# Patient Record
Sex: FEMALE | Race: WHITE | NOT HISPANIC OR LATINO | Employment: UNEMPLOYED | URBAN - METROPOLITAN AREA
[De-identification: names, ages, dates, MRNs, and addresses within clinical notes are randomized per-mention and may not be internally consistent; named-entity substitution may affect disease eponyms.]

---

## 2023-05-12 LAB
ABO GROUP (TYPE) IN BLOOD: NORMAL
ALANINE AMINOTRANSFERASE (SGPT) (U/L) IN SER/PLAS: 14 U/L (ref 7–45)
ALBUMIN (G/DL) IN SER/PLAS: 4 G/DL (ref 3.4–5)
ALKALINE PHOSPHATASE (U/L) IN SER/PLAS: 54 U/L (ref 33–110)
ANION GAP IN SER/PLAS: 13 MMOL/L (ref 10–20)
ANTIBODY SCREEN: NORMAL
ASPARTATE AMINOTRANSFERASE (SGOT) (U/L) IN SER/PLAS: 21 U/L (ref 9–39)
BILIRUBIN TOTAL (MG/DL) IN SER/PLAS: 0.4 MG/DL (ref 0–1.2)
CALCIUM (MG/DL) IN SER/PLAS: 9.2 MG/DL (ref 8.6–10.3)
CARBON DIOXIDE, TOTAL (MMOL/L) IN SER/PLAS: 23 MMOL/L (ref 21–32)
CHLORIDE (MMOL/L) IN SER/PLAS: 105 MMOL/L (ref 98–107)
CREATININE (MG/DL) IN SER/PLAS: 0.71 MG/DL (ref 0.5–1.05)
CREATININE (MG/DL) IN URINE: 230 MG/DL (ref 20–320)
ERYTHROCYTE DISTRIBUTION WIDTH (RATIO) BY AUTOMATED COUNT: 14.6 % (ref 11.5–14.5)
ERYTHROCYTE MEAN CORPUSCULAR HEMOGLOBIN CONCENTRATION (G/DL) BY AUTOMATED: 33.1 G/DL (ref 32–36)
ERYTHROCYTE MEAN CORPUSCULAR VOLUME (FL) BY AUTOMATED COUNT: 88 FL (ref 80–100)
ERYTHROCYTES (10*6/UL) IN BLOOD BY AUTOMATED COUNT: 4.17 X10E12/L (ref 4–5.2)
ESTIMATED AVERAGE GLUCOSE FOR HBA1C: 105 MG/DL
GFR FEMALE: >90 ML/MIN/1.73M2
GLUCOSE (MG/DL) IN SER/PLAS: 50 MG/DL (ref 74–99)
HEMATOCRIT (%) IN BLOOD BY AUTOMATED COUNT: 36.9 % (ref 36–46)
HEMOGLOBIN (G/DL) IN BLOOD: 12.2 G/DL (ref 12–16)
HEMOGLOBIN A1C/HEMOGLOBIN TOTAL IN BLOOD: 5.3 %
HEPATITIS B VIRUS SURFACE AG PRESENCE IN SERUM: NONREACTIVE
HEPATITIS C VIRUS AB PRESENCE IN SERUM: NONREACTIVE
HIV 1/ 2 AG/AB SCREEN: NONREACTIVE
LEUKOCYTES (10*3/UL) IN BLOOD BY AUTOMATED COUNT: 12.1 X10E9/L (ref 4.4–11.3)
PLATELETS (10*3/UL) IN BLOOD AUTOMATED COUNT: 298 X10E9/L (ref 150–450)
POTASSIUM (MMOL/L) IN SER/PLAS: 4.1 MMOL/L (ref 3.5–5.3)
PROTEIN (MG/DL) IN URINE: 16 MG/DL (ref 5–24)
PROTEIN TOTAL: 6.5 G/DL (ref 6.4–8.2)
PROTEIN/CREATININE (MG/MG) IN URINE: 0.07 MG/MG CREAT (ref 0–0.17)
REFLEX ADDED, ANEMIA PANEL: ABNORMAL
RH FACTOR: NORMAL
SODIUM (MMOL/L) IN SER/PLAS: 137 MMOL/L (ref 136–145)
SYPHILIS TOTAL AB: NONREACTIVE
THYROTROPIN (MIU/L) IN SER/PLAS BY DETECTION LIMIT <= 0.05 MIU/L: 1.72 MIU/L (ref 0.44–3.98)
UREA NITROGEN (MG/DL) IN SER/PLAS: 10 MG/DL (ref 6–23)

## 2023-05-13 LAB
CHLAMYDIA TRACH., AMPLIFIED: NEGATIVE
N. GONORRHEA, AMPLIFIED: NEGATIVE
RUBELLA VIRUS IGG AB: POSITIVE

## 2023-05-14 LAB — URINE CULTURE: NORMAL

## 2023-05-15 LAB
HEMOGLOBIN A2: 2.9 %
HEMOGLOBIN A: 96.7 %
HEMOGLOBIN F: 0.4 %
HEMOGLOBIN IDENTIFICATION INTERPRETATION: NORMAL
PATH REVIEW-HGB IDENTIFICATION: NORMAL

## 2023-08-04 LAB
ERYTHROCYTE DISTRIBUTION WIDTH (RATIO) BY AUTOMATED COUNT: 14.6 % (ref 11.5–14.5)
ERYTHROCYTE MEAN CORPUSCULAR HEMOGLOBIN CONCENTRATION (G/DL) BY AUTOMATED: 34 G/DL (ref 32–36)
ERYTHROCYTE MEAN CORPUSCULAR VOLUME (FL) BY AUTOMATED COUNT: 91 FL (ref 80–100)
ERYTHROCYTES (10*6/UL) IN BLOOD BY AUTOMATED COUNT: 4.03 X10E12/L (ref 4–5.2)
ESTIMATED AVERAGE GLUCOSE FOR HBA1C: 94 MG/DL
HEMATOCRIT (%) IN BLOOD BY AUTOMATED COUNT: 36.8 % (ref 36–46)
HEMOGLOBIN (G/DL) IN BLOOD: 12.5 G/DL (ref 12–16)
HEMOGLOBIN A1C/HEMOGLOBIN TOTAL IN BLOOD: 4.9 %
LEUKOCYTES (10*3/UL) IN BLOOD BY AUTOMATED COUNT: 10.5 X10E9/L (ref 4.4–11.3)
PLATELETS (10*3/UL) IN BLOOD AUTOMATED COUNT: 265 X10E9/L (ref 150–450)
REFLEX ADDED, ANEMIA PANEL: ABNORMAL
SYPHILIS TOTAL AB: NONREACTIVE

## 2023-09-25 PROBLEM — E10.65 TYPE 1 DIABETES MELLITUS WITH HYPERGLYCEMIA (MULTI): Status: ACTIVE | Noted: 2023-09-25

## 2023-09-25 PROBLEM — L72.0 EPIDERMAL CYST: Status: ACTIVE | Noted: 2018-04-16

## 2023-09-25 PROBLEM — L23.7 ALLERGIC CONTACT DERMATITIS DUE TO PLANTS, EXCEPT FOOD: Status: ACTIVE | Noted: 2018-04-16

## 2023-09-25 PROBLEM — E10.9 TYPE 1 DIABETES MELLITUS (MULTI): Status: ACTIVE | Noted: 2023-09-25

## 2023-09-25 PROBLEM — F41.9 ANXIETY: Status: ACTIVE | Noted: 2023-09-25

## 2023-09-25 PROBLEM — Z96.41 INSULIN PUMP IN PLACE: Status: ACTIVE | Noted: 2023-09-25

## 2023-09-25 PROBLEM — E11.36: Status: ACTIVE | Noted: 2023-09-25

## 2023-09-25 PROBLEM — L70.0 CYSTIC ACNE VULGARIS: Status: ACTIVE | Noted: 2018-04-16

## 2023-09-25 PROBLEM — O24.011: Status: ACTIVE | Noted: 2023-09-25

## 2023-09-25 PROBLEM — E11.9 DIABETES MELLITUS (MULTI): Status: ACTIVE | Noted: 2023-09-25

## 2023-09-25 PROBLEM — H04.123 DRY EYE SYNDROME OF BOTH LACRIMAL GLANDS: Status: ACTIVE | Noted: 2023-09-25

## 2023-09-25 RX ORDER — MINOCYCLINE HYDROCHLORIDE 100 MG/1
CAPSULE ORAL
COMMUNITY
Start: 2016-12-13 | End: 2023-09-27

## 2023-09-25 RX ORDER — TRIAMCINOLONE ACETONIDE 1 MG/G
CREAM TOPICAL
COMMUNITY
Start: 2017-07-10 | End: 2024-01-18

## 2023-09-25 RX ORDER — BLOOD SUGAR DIAGNOSTIC
STRIP MISCELLANEOUS
COMMUNITY
Start: 2015-12-03 | End: 2024-02-01 | Stop reason: ALTCHOICE

## 2023-09-25 RX ORDER — CLINDAMYCIN PHOSPHATE 10 UG/ML
LOTION TOPICAL
Status: ON HOLD | COMMUNITY
Start: 2016-02-03 | End: 2023-10-07 | Stop reason: ALTCHOICE

## 2023-09-25 RX ORDER — URINE ACETONE TEST STRIPS
STRIP MISCELLANEOUS
COMMUNITY
Start: 2015-04-23 | End: 2024-02-01 | Stop reason: ALTCHOICE

## 2023-09-25 RX ORDER — AMMONIUM LACTATE 12 G/100G
CREAM TOPICAL
COMMUNITY
Start: 2016-12-29 | End: 2024-01-18

## 2023-09-27 ASSESSMENT — EDINBURGH POSTNATAL DEPRESSION SCALE (EPDS)
I HAVE BEEN SO UNHAPPY THAT I HAVE HAD DIFFICULTY SLEEPING: NOT AT ALL
TOTAL SCORE: 9
I HAVE BEEN SO UNHAPPY THAT I HAVE BEEN CRYING: ONLY OCCASIONALLY
I HAVE FELT SAD OR MISERABLE: NOT VERY OFTEN
I HAVE BLAMED MYSELF UNNECESSARILY WHEN THINGS WENT WRONG: NOT VERY OFTEN
I HAVE BEEN ANXIOUS OR WORRIED FOR NO GOOD REASON: YES, SOMETIMES
I HAVE FELT SCARED OR PANICKY FOR NO GOOD REASON: NO, NOT AT ALL
I HAVE LOOKED FORWARD WITH ENJOYMENT TO THINGS: RATHER LESS THAN I USED TO
THE THOUGHT OF HARMING MYSELF HAS OCCURRED TO ME: NEVER
THINGS HAVE BEEN GETTING ON TOP OF ME: YES, SOMETIMES I HAVEN'T BEEN COPING AS WELL AS USUAL
I HAVE BEEN ABLE TO LAUGH AND SEE THE FUNNY SIDE OF THINGS: NOT QUITE SO MUCH NOW

## 2023-10-01 LAB — GROUP B STREP SCREEN: ABNORMAL

## 2023-10-02 ENCOUNTER — PROCEDURE VISIT (OUTPATIENT)
Dept: OBSTETRICS AND GYNECOLOGY | Facility: CLINIC | Age: 28
End: 2023-10-02
Payer: MEDICAID

## 2023-10-02 ENCOUNTER — HOSPITAL ENCOUNTER (OUTPATIENT)
Facility: HOSPITAL | Age: 28
End: 2023-10-02
Attending: STUDENT IN AN ORGANIZED HEALTH CARE EDUCATION/TRAINING PROGRAM | Admitting: STUDENT IN AN ORGANIZED HEALTH CARE EDUCATION/TRAINING PROGRAM
Payer: MEDICAID

## 2023-10-02 VITALS — SYSTOLIC BLOOD PRESSURE: 126 MMHG | DIASTOLIC BLOOD PRESSURE: 79 MMHG

## 2023-10-02 DIAGNOSIS — Z3A.36 36 WEEKS GESTATION OF PREGNANCY (HHS-HCC): Primary | ICD-10-CM

## 2023-10-02 DIAGNOSIS — O24.013 TYPE 1 DIABETES MELLITUS AFFECTING PREGNANCY IN THIRD TRIMESTER, ANTEPARTUM (HHS-HCC): ICD-10-CM

## 2023-10-02 PROBLEM — O24.011: Status: RESOLVED | Noted: 2023-09-25 | Resolved: 2023-10-02

## 2023-10-02 PROBLEM — E11.9 DIABETES MELLITUS (MULTI): Status: RESOLVED | Noted: 2023-09-25 | Resolved: 2023-10-02

## 2023-10-02 PROCEDURE — 59025 FETAL NON-STRESS TEST: CPT | Performed by: OBSTETRICS & GYNECOLOGY

## 2023-10-02 NOTE — PROGRESS NOTES
Becca Irene, a  at 36w2d with an TIM of 10/28/2023, by Ultrasound, was seen at  [facility] for a nonstress test for T1DM    Non-Stress Test   Baseline Fetal Heart Rate for Non-Stress Test: 150 BPM  Variability in Waveform for Non-Stress Test: Moderate  Accelerations in Non-Stress Test: Yes  Decelerations in Non-Stress Test: None  Contractions in Non-Stress Test: Irregular  Acoustic Stimulator for Non-Stress Test: No  Interpretation of Non-Stress Test   Interpretation of Non-Stress Test: Reactive    Ramirez Rincon MD  Maternal Fetal Medicine

## 2023-10-05 PROBLEM — Z3A.36 36 WEEKS GESTATION OF PREGNANCY (HHS-HCC): Status: ACTIVE | Noted: 2023-10-05

## 2023-10-05 PROBLEM — F32.A DEPRESSION AFFECTING PREGNANCY IN THIRD TRIMESTER, ANTEPARTUM (HHS-HCC): Status: ACTIVE | Noted: 2023-10-05

## 2023-10-05 PROBLEM — O99.343 DEPRESSION AFFECTING PREGNANCY IN THIRD TRIMESTER, ANTEPARTUM (HHS-HCC): Status: ACTIVE | Noted: 2023-10-05

## 2023-10-05 NOTE — PROGRESS NOTES
26yo G1 at 36w6d presenting for follow up for T1DM.    Doing well, no acute complaints.  Has noted significant BLE over the last several days No VB, LOF, ctx. endorses good FM.  No headache, vision cahgnes, RUQ pain.      CGM reviewed    TAR: 27%  TIR: 71%  Low: 2%  vLow: <1%  Avg 122    O: See ACOGs  Gen-NAD  Cardiac- good peripheral perfusion   Respiratory- non-labored breathing   Abdomen- soft, non-tender, gravid  Extremities- symmetrical     Sono- BPP 8/8, CADE 17.9  9/22:EFW 93%, AC >99%, normal CADE, BPP 8/8    9/1:EFW 76%, AC 89%, normal CADE, BPP 8/8, breech    T1DM   - Diagnosed at age 9. Last year hospitalization for DKA in s/o flu, denies recent DKA episodes or hospitalizations   - Baseline HELLP labs and p/c normal  - Last HA1c 6.8 (3/2023), repeat 8/4 4.9%  - Fetal echo wnl  - Ophthalmology and podiatry referral provided, pending appt.  Reminded to schedule  - EKG with right axis deviation and short NH interval.   - hypoglycemic aware  - has ketone strips and glucagon at home  - Ha dong discussion on peripartum management, tentatively wants to krystle pump.  See PL for peripartum plan.   Pump profiles set up for intrapartum and immediate postpartum settings.  - Current pump settings adjusted as below:  Basal                     Carb Ratio         CF     2263-7060 1.65          1:5.0            1:16  [id number] 1.70          1:5.0            1:16   [id number] 1.35          1:3.0            1:16  5667-9631 1.20          1:2.5            1:16  [id number] 1.20          1:3.0            1:16    Max bolus 25    LGA Fetus   - Last EFW 93%ile with EFW 3078g on 9/22/23  - Extrapolation EFW at time of delivery 3700-3900gm    Depression  -Prior to pregnancy was on fluoxetine- stopped prior to pregnancy  -Symptoms stable      gHTN  - BP mild range x2 in the office.  Asymptomatic  - Will check PEC labs today  - Will move up IOL to 37w0d (tomorrow)    IUP   - Last Pap  NIL 2022  - declines Flu, COVID vaccines  - NT subopt but  grossly normal, declines aneuploidy screening.  - ppBC: desires OCPs  - Anatomy unremarkable  - s/p Tdap   - Does not plan to breastfeed   - Patient is Hero and does not have a smart phone with ability to send in CGM and pump data. Will schedule visits weekly now for review   -GBS positive    -IOL scheduled tomorrow.    Ramirez Rincon MD

## 2023-10-06 ENCOUNTER — ROUTINE PRENATAL (OUTPATIENT)
Dept: MATERNAL FETAL MEDICINE | Facility: CLINIC | Age: 28
End: 2023-10-06
Payer: MEDICAID

## 2023-10-06 ENCOUNTER — LAB (OUTPATIENT)
Dept: LAB | Facility: LAB | Age: 28
End: 2023-10-06
Payer: MEDICAID

## 2023-10-06 ENCOUNTER — APPOINTMENT (OUTPATIENT)
Dept: RADIOLOGY | Facility: CLINIC | Age: 28
End: 2023-10-06
Payer: MEDICAID

## 2023-10-06 ENCOUNTER — ANCILLARY PROCEDURE (OUTPATIENT)
Dept: RADIOLOGY | Facility: CLINIC | Age: 28
End: 2023-10-06
Payer: MEDICAID

## 2023-10-06 VITALS — DIASTOLIC BLOOD PRESSURE: 94 MMHG | BODY MASS INDEX: 35.44 KG/M2 | SYSTOLIC BLOOD PRESSURE: 137 MMHG | WEIGHT: 219.6 LBS

## 2023-10-06 DIAGNOSIS — Z3A.37 37 WEEKS GESTATION OF PREGNANCY (HHS-HCC): ICD-10-CM

## 2023-10-06 DIAGNOSIS — O13.3 GESTATIONAL HYPERTENSION, THIRD TRIMESTER (HHS-HCC): ICD-10-CM

## 2023-10-06 DIAGNOSIS — O24.013 TYPE 1 DIABETES MELLITUS AFFECTING PREGNANCY IN THIRD TRIMESTER, ANTEPARTUM (HHS-HCC): Primary | ICD-10-CM

## 2023-10-06 DIAGNOSIS — Z32.01 PREGNANCY TEST POSITIVE (HHS-HCC): ICD-10-CM

## 2023-10-06 DIAGNOSIS — Z3A.36 36 WEEKS GESTATION OF PREGNANCY (HHS-HCC): ICD-10-CM

## 2023-10-06 DIAGNOSIS — F32.A DEPRESSION AFFECTING PREGNANCY IN THIRD TRIMESTER, ANTEPARTUM (HHS-HCC): ICD-10-CM

## 2023-10-06 DIAGNOSIS — O99.343 DEPRESSION AFFECTING PREGNANCY IN THIRD TRIMESTER, ANTEPARTUM (HHS-HCC): ICD-10-CM

## 2023-10-06 LAB
ALBUMIN SERPL BCP-MCNC: 3.5 G/DL (ref 3.4–5)
ALP SERPL-CCNC: 191 U/L (ref 33–110)
ALT SERPL W P-5'-P-CCNC: 12 U/L (ref 7–45)
ANION GAP SERPL CALC-SCNC: 14 MMOL/L (ref 10–20)
AST SERPL W P-5'-P-CCNC: 21 U/L (ref 9–39)
BILIRUB SERPL-MCNC: 0.6 MG/DL (ref 0–1.2)
BUN SERPL-MCNC: 5 MG/DL (ref 6–23)
CALCIUM SERPL-MCNC: 9 MG/DL (ref 8.6–10.3)
CHLORIDE SERPL-SCNC: 107 MMOL/L (ref 98–107)
CO2 SERPL-SCNC: 20 MMOL/L (ref 21–32)
CREAT SERPL-MCNC: 0.61 MG/DL (ref 0.5–1.05)
ERYTHROCYTE [DISTWIDTH] IN BLOOD BY AUTOMATED COUNT: 13.9 % (ref 11.5–14.5)
GFR SERPL CREATININE-BSD FRML MDRD: >90 ML/MIN/1.73M*2
GLUCOSE SERPL-MCNC: 95 MG/DL (ref 74–99)
HCT VFR BLD AUTO: 35.2 % (ref 36–46)
HGB BLD-MCNC: 12 G/DL (ref 12–16)
LDH SERPL L TO P-CCNC: 172 U/L (ref 84–246)
MCH RBC QN AUTO: 31.2 PG (ref 26–34)
MCHC RBC AUTO-ENTMCNC: 34.1 G/DL (ref 32–36)
MCV RBC AUTO: 91 FL (ref 80–100)
NRBC BLD-RTO: 0 /100 WBCS (ref 0–0)
PLATELET # BLD AUTO: 196 X10*3/UL (ref 150–450)
PMV BLD AUTO: 11 FL (ref 7.5–11.5)
POTASSIUM SERPL-SCNC: 4.1 MMOL/L (ref 3.5–5.3)
PROT SERPL-MCNC: 6.1 G/DL (ref 6.4–8.2)
RBC # BLD AUTO: 3.85 X10*6/UL (ref 4–5.2)
SODIUM SERPL-SCNC: 137 MMOL/L (ref 136–145)
WBC # BLD AUTO: 11.7 X10*3/UL (ref 4.4–11.3)

## 2023-10-06 PROCEDURE — 36415 COLL VENOUS BLD VENIPUNCTURE: CPT

## 2023-10-06 PROCEDURE — 80053 COMPREHEN METABOLIC PANEL: CPT

## 2023-10-06 PROCEDURE — 99215 OFFICE O/P EST HI 40 MIN: CPT | Performed by: OBSTETRICS & GYNECOLOGY

## 2023-10-06 PROCEDURE — 85027 COMPLETE CBC AUTOMATED: CPT

## 2023-10-06 PROCEDURE — 83615 LACTATE (LD) (LDH) ENZYME: CPT

## 2023-10-06 PROCEDURE — 82570 ASSAY OF URINE CREATININE: CPT | Performed by: OBSTETRICS & GYNECOLOGY

## 2023-10-06 PROCEDURE — 76819 FETAL BIOPHYS PROFIL W/O NST: CPT

## 2023-10-06 PROCEDURE — 76819 FETAL BIOPHYS PROFIL W/O NST: CPT | Performed by: OBSTETRICS & GYNECOLOGY

## 2023-10-06 RX ORDER — BLOOD-GLUCOSE TRANSMITTER
EACH MISCELLANEOUS
Qty: 1 EACH | Refills: 3 | Status: SHIPPED | OUTPATIENT
Start: 2023-10-06 | End: 2024-01-18 | Stop reason: SDUPTHER

## 2023-10-07 ENCOUNTER — ANESTHESIA (OUTPATIENT)
Dept: OBSTETRICS AND GYNECOLOGY | Facility: HOSPITAL | Age: 28
End: 2023-10-07
Payer: MEDICAID

## 2023-10-07 ENCOUNTER — HOSPITAL ENCOUNTER (INPATIENT)
Facility: HOSPITAL | Age: 28
LOS: 4 days | Discharge: HOME | End: 2023-10-11
Attending: OBSTETRICS & GYNECOLOGY | Admitting: OBSTETRICS & GYNECOLOGY
Payer: MEDICAID

## 2023-10-07 ENCOUNTER — APPOINTMENT (OUTPATIENT)
Dept: OBSTETRICS AND GYNECOLOGY | Facility: HOSPITAL | Age: 28
End: 2023-10-07
Payer: MEDICAID

## 2023-10-07 ENCOUNTER — ANESTHESIA EVENT (OUTPATIENT)
Dept: OBSTETRICS AND GYNECOLOGY | Facility: HOSPITAL | Age: 28
End: 2023-10-07
Payer: MEDICAID

## 2023-10-07 DIAGNOSIS — G89.18 POSTOPERATIVE PAIN: ICD-10-CM

## 2023-10-07 DIAGNOSIS — Z3A.37 37 WEEKS GESTATION OF PREGNANCY (HHS-HCC): Primary | ICD-10-CM

## 2023-10-07 DIAGNOSIS — Z98.891 STATUS POST CESAREAN DELIVERY: ICD-10-CM

## 2023-10-07 PROBLEM — K21.9 GASTROESOPHAGEAL REFLUX DISEASE: Status: ACTIVE | Noted: 2023-10-07

## 2023-10-07 LAB
ABO GROUP (TYPE) IN BLOOD: NORMAL
ANTIBODY SCREEN: NORMAL
CREAT UR-MCNC: 65.6 MG/DL (ref 20–320)
ERYTHROCYTE [DISTWIDTH] IN BLOOD BY AUTOMATED COUNT: 13.7 % (ref 11.5–14.5)
GLUCOSE BLD MANUAL STRIP-MCNC: 108 MG/DL (ref 74–99)
GLUCOSE BLD MANUAL STRIP-MCNC: 118 MG/DL (ref 74–99)
HCT VFR BLD AUTO: 33.8 % (ref 36–46)
HGB BLD-MCNC: 11.4 G/DL (ref 12–16)
MCH RBC QN AUTO: 31.3 PG (ref 26–34)
MCHC RBC AUTO-ENTMCNC: 33.7 G/DL (ref 32–36)
MCV RBC AUTO: 93 FL (ref 80–100)
NRBC BLD-RTO: 0 /100 WBCS (ref 0–0)
PLATELET # BLD AUTO: 181 X10*3/UL (ref 150–450)
PMV BLD AUTO: 11.8 FL (ref 7.5–11.5)
POC FINGERSTICK BLOOD GLUCOSE: 108 MG/DL (ref 70–100)
PROT UR-ACNC: 12 MG/DL (ref 5–24)
PROT/CREAT UR: 0.18 MG/MG CREAT (ref 0–0.17)
RBC # BLD AUTO: 3.64 X10*6/UL (ref 4–5.2)
RH FACTOR (ANTIGEN D): NORMAL
T PALLIDUM AB SER QL: NONREACTIVE
WBC # BLD AUTO: 10.4 X10*3/UL (ref 4.4–11.3)

## 2023-10-07 PROCEDURE — 82947 ASSAY GLUCOSE BLOOD QUANT: CPT

## 2023-10-07 PROCEDURE — 86920 COMPATIBILITY TEST SPIN: CPT

## 2023-10-07 PROCEDURE — 2500000001 HC RX 250 WO HCPCS SELF ADMINISTERED DRUGS (ALT 637 FOR MEDICARE OP)

## 2023-10-07 PROCEDURE — 86900 BLOOD TYPING SEROLOGIC ABO: CPT | Performed by: STUDENT IN AN ORGANIZED HEALTH CARE EDUCATION/TRAINING PROGRAM

## 2023-10-07 PROCEDURE — 2500000004 HC RX 250 GENERAL PHARMACY W/ HCPCS (ALT 636 FOR OP/ED)

## 2023-10-07 PROCEDURE — 2500000004 HC RX 250 GENERAL PHARMACY W/ HCPCS (ALT 636 FOR OP/ED): Performed by: STUDENT IN AN ORGANIZED HEALTH CARE EDUCATION/TRAINING PROGRAM

## 2023-10-07 PROCEDURE — 36415 COLL VENOUS BLD VENIPUNCTURE: CPT | Performed by: STUDENT IN AN ORGANIZED HEALTH CARE EDUCATION/TRAINING PROGRAM

## 2023-10-07 PROCEDURE — 86780 TREPONEMA PALLIDUM: CPT | Performed by: STUDENT IN AN ORGANIZED HEALTH CARE EDUCATION/TRAINING PROGRAM

## 2023-10-07 PROCEDURE — 1120000001 HC OB PRIVATE ROOM DAILY

## 2023-10-07 PROCEDURE — 2580000001 HC RX 258 IV SOLUTIONS: Performed by: STUDENT IN AN ORGANIZED HEALTH CARE EDUCATION/TRAINING PROGRAM

## 2023-10-07 PROCEDURE — 85027 COMPLETE CBC AUTOMATED: CPT | Performed by: STUDENT IN AN ORGANIZED HEALTH CARE EDUCATION/TRAINING PROGRAM

## 2023-10-07 PROCEDURE — 3E0P7VZ INTRODUCTION OF HORMONE INTO FEMALE REPRODUCTIVE, VIA NATURAL OR ARTIFICIAL OPENING: ICD-10-PCS | Performed by: OBSTETRICS & GYNECOLOGY

## 2023-10-07 RX ORDER — OXYTOCIN/0.9 % SODIUM CHLORIDE 30/500 ML
2-30 PLASTIC BAG, INJECTION (ML) INTRAVENOUS CONTINUOUS
Status: DISCONTINUED | OUTPATIENT
Start: 2023-10-07 | End: 2023-10-09

## 2023-10-07 RX ORDER — CARBOPROST TROMETHAMINE 250 UG/ML
250 INJECTION, SOLUTION INTRAMUSCULAR ONCE AS NEEDED
Status: COMPLETED | OUTPATIENT
Start: 2023-10-07 | End: 2023-10-09

## 2023-10-07 RX ORDER — BUTORPHANOL TARTRATE 1 MG/ML
1 INJECTION INTRAMUSCULAR; INTRAVENOUS EVERY 10 MIN PRN
Status: DISCONTINUED | OUTPATIENT
Start: 2023-10-07 | End: 2023-10-10

## 2023-10-07 RX ORDER — MISOPROSTOL 200 UG/1
800 TABLET ORAL ONCE AS NEEDED
Status: COMPLETED | OUTPATIENT
Start: 2023-10-07 | End: 2023-10-09

## 2023-10-07 RX ORDER — LOPERAMIDE HYDROCHLORIDE 2 MG/1
4 CAPSULE ORAL EVERY 2 HOUR PRN
Status: DISCONTINUED | OUTPATIENT
Start: 2023-10-07 | End: 2023-10-09

## 2023-10-07 RX ORDER — SODIUM CHLORIDE, SODIUM LACTATE, POTASSIUM CHLORIDE, CALCIUM CHLORIDE 600; 310; 30; 20 MG/100ML; MG/100ML; MG/100ML; MG/100ML
125 INJECTION, SOLUTION INTRAVENOUS CONTINUOUS
Status: DISCONTINUED | OUTPATIENT
Start: 2023-10-07 | End: 2023-10-08

## 2023-10-07 RX ORDER — DEXTROSE 40 %
15 GEL (GRAM) ORAL
Status: DISCONTINUED | OUTPATIENT
Start: 2023-10-07 | End: 2023-10-11 | Stop reason: HOSPADM

## 2023-10-07 RX ORDER — PENICILLIN G 3000000 [IU]/50ML
3 INJECTION, SOLUTION INTRAVENOUS EVERY 4 HOURS
Status: DISCONTINUED | OUTPATIENT
Start: 2023-10-07 | End: 2023-10-09

## 2023-10-07 RX ORDER — LABETALOL HYDROCHLORIDE 5 MG/ML
20 INJECTION, SOLUTION INTRAVENOUS ONCE AS NEEDED
Status: DISCONTINUED | OUTPATIENT
Start: 2023-10-07 | End: 2023-10-09

## 2023-10-07 RX ORDER — ONDANSETRON HYDROCHLORIDE 2 MG/ML
4 INJECTION, SOLUTION INTRAVENOUS EVERY 6 HOURS PRN
Status: DISCONTINUED | OUTPATIENT
Start: 2023-10-07 | End: 2023-10-09

## 2023-10-07 RX ORDER — OXYTOCIN 10 [USP'U]/ML
10 INJECTION, SOLUTION INTRAMUSCULAR; INTRAVENOUS ONCE AS NEEDED
Status: DISCONTINUED | OUTPATIENT
Start: 2023-10-07 | End: 2023-10-09

## 2023-10-07 RX ORDER — FENTANYL CITRATE 50 UG/ML
25 INJECTION, SOLUTION INTRAMUSCULAR; INTRAVENOUS ONCE
Status: COMPLETED | OUTPATIENT
Start: 2023-10-07 | End: 2023-10-07

## 2023-10-07 RX ORDER — HYDRALAZINE HYDROCHLORIDE 20 MG/ML
5 INJECTION INTRAMUSCULAR; INTRAVENOUS ONCE AS NEEDED
Status: DISCONTINUED | OUTPATIENT
Start: 2023-10-07 | End: 2023-10-09

## 2023-10-07 RX ORDER — DEXTROSE 50 % IN WATER (D50W) INTRAVENOUS SYRINGE
25
Status: DISCONTINUED | OUTPATIENT
Start: 2023-10-07 | End: 2023-10-10

## 2023-10-07 RX ORDER — FENTANYL/BUPIVACAINE/NS/PF 2MCG/ML-.1
0-54 PLASTIC BAG, INJECTION (ML) INJECTION CONTINUOUS
Status: DISCONTINUED | OUTPATIENT
Start: 2023-10-07 | End: 2023-10-09

## 2023-10-07 RX ORDER — CYCLOBENZAPRINE HCL 10 MG
10 TABLET ORAL ONCE
Status: COMPLETED | OUTPATIENT
Start: 2023-10-07 | End: 2023-10-07

## 2023-10-07 RX ORDER — TRANEXAMIC ACID 100 MG/ML
1000 INJECTION, SOLUTION INTRAVENOUS ONCE AS NEEDED
Status: COMPLETED | OUTPATIENT
Start: 2023-10-07 | End: 2023-10-09

## 2023-10-07 RX ORDER — ONDANSETRON 4 MG/1
4 TABLET, FILM COATED ORAL EVERY 6 HOURS PRN
Status: DISCONTINUED | OUTPATIENT
Start: 2023-10-07 | End: 2023-10-09

## 2023-10-07 RX ORDER — METOCLOPRAMIDE HYDROCHLORIDE 5 MG/ML
10 INJECTION INTRAMUSCULAR; INTRAVENOUS EVERY 6 HOURS PRN
Status: DISCONTINUED | OUTPATIENT
Start: 2023-10-07 | End: 2023-10-10

## 2023-10-07 RX ORDER — METOCLOPRAMIDE 10 MG/1
10 TABLET ORAL EVERY 6 HOURS PRN
Status: DISCONTINUED | OUTPATIENT
Start: 2023-10-07 | End: 2023-10-10

## 2023-10-07 RX ORDER — NALBUPHINE HYDROCHLORIDE 10 MG/ML
10 INJECTION, SOLUTION INTRAMUSCULAR; INTRAVENOUS; SUBCUTANEOUS
Status: DISCONTINUED | OUTPATIENT
Start: 2023-10-07 | End: 2023-10-10

## 2023-10-07 RX ORDER — METHYLERGONOVINE MALEATE 0.2 MG/ML
0.2 INJECTION INTRAVENOUS ONCE AS NEEDED
Status: DISCONTINUED | OUTPATIENT
Start: 2023-10-07 | End: 2023-10-09

## 2023-10-07 RX ORDER — OXYTOCIN/0.9 % SODIUM CHLORIDE 30/500 ML
60 PLASTIC BAG, INJECTION (ML) INTRAVENOUS
Status: DISCONTINUED | OUTPATIENT
Start: 2023-10-07 | End: 2023-10-09

## 2023-10-07 RX ORDER — NIFEDIPINE 10 MG/1
10 CAPSULE ORAL ONCE AS NEEDED
Status: DISCONTINUED | OUTPATIENT
Start: 2023-10-07 | End: 2023-10-09

## 2023-10-07 RX ORDER — LIDOCAINE HYDROCHLORIDE 10 MG/ML
30 INJECTION INFILTRATION; PERINEURAL ONCE AS NEEDED
Status: DISCONTINUED | OUTPATIENT
Start: 2023-10-07 | End: 2023-10-10

## 2023-10-07 RX ORDER — TERBUTALINE SULFATE 1 MG/ML
0.25 INJECTION SUBCUTANEOUS ONCE AS NEEDED
Status: DISCONTINUED | OUTPATIENT
Start: 2023-10-07 | End: 2023-10-10

## 2023-10-07 RX ADMIN — CYCLOBENZAPRINE 10 MG: 10 TABLET, FILM COATED ORAL at 18:41

## 2023-10-07 RX ADMIN — PENICILLIN G POTASSIUM 5 MILLION UNITS: 5000000 POWDER, FOR SOLUTION INTRAMUSCULAR; INTRAPLEURAL; INTRATHECAL; INTRAVENOUS at 11:47

## 2023-10-07 RX ADMIN — MISOPROSTOL 25 MCG: 100 TABLET ORAL at 12:25

## 2023-10-07 RX ADMIN — SODIUM CHLORIDE, POTASSIUM CHLORIDE, SODIUM LACTATE AND CALCIUM CHLORIDE 125 ML/HR: 600; 310; 30; 20 INJECTION, SOLUTION INTRAVENOUS at 11:47

## 2023-10-07 RX ADMIN — LIDOCAINE HYDROCHLORIDE,EPINEPHRINE BITARTRATE 3 ML: 20; .005 INJECTION, SOLUTION EPIDURAL; INFILTRATION; INTRACAUDAL; PERINEURAL at 23:55

## 2023-10-07 RX ADMIN — PENICILLIN G 3 MILLION UNITS: 3000000 INJECTION, SOLUTION INTRAVENOUS at 15:31

## 2023-10-07 RX ADMIN — Medication 5 ML: at 23:58

## 2023-10-07 RX ADMIN — SODIUM CHLORIDE, POTASSIUM CHLORIDE, SODIUM LACTATE AND CALCIUM CHLORIDE 125 ML/HR: 600; 310; 30; 20 INJECTION, SOLUTION INTRAVENOUS at 20:39

## 2023-10-07 RX ADMIN — SODIUM CHLORIDE, POTASSIUM CHLORIDE, SODIUM LACTATE AND CALCIUM CHLORIDE 500 ML: 600; 310; 30; 20 INJECTION, SOLUTION INTRAVENOUS at 23:18

## 2023-10-07 RX ADMIN — PENICILLIN G 3 MILLION UNITS: 3000000 INJECTION, SOLUTION INTRAVENOUS at 20:39

## 2023-10-07 RX ADMIN — FENTANYL CITRATE 25 MCG: 50 INJECTION INTRAMUSCULAR; INTRAVENOUS at 12:06

## 2023-10-07 SDOH — ECONOMIC STABILITY: HOUSING INSECURITY: DO YOU FEEL UNSAFE GOING BACK TO THE PLACE WHERE YOU ARE LIVING?: NO

## 2023-10-07 SDOH — HEALTH STABILITY: MENTAL HEALTH: WERE YOU ABLE TO COMPLETE ALL THE BEHAVIORAL HEALTH SCREENINGS?: YES

## 2023-10-07 SDOH — HEALTH STABILITY: MENTAL HEALTH: HAVE YOU USED ANY PRESCRIPTION DRUGS OTHER THAN PRESCRIBED IN THE PAST 12 MONTHS?: NO

## 2023-10-07 SDOH — ECONOMIC STABILITY: FOOD INSECURITY: WITHIN THE PAST 12 MONTHS, YOU WORRIED THAT YOUR FOOD WOULD RUN OUT BEFORE YOU GOT MONEY TO BUY MORE.: NEVER TRUE

## 2023-10-07 SDOH — SOCIAL STABILITY: SOCIAL INSECURITY: ARE YOU OR HAVE YOU BEEN THREATENED OR ABUSED PHYSICALLY, EMOTIONALLY, OR SEXUALLY BY ANYONE?: NO

## 2023-10-07 SDOH — HEALTH STABILITY: MENTAL HEALTH: NON-SPECIFIC ACTIVE SUICIDAL THOUGHTS (PAST 1 MONTH): NO

## 2023-10-07 SDOH — HEALTH STABILITY: MENTAL HEALTH: WISH TO BE DEAD (PAST 1 MONTH): NO

## 2023-10-07 SDOH — HEALTH STABILITY: MENTAL HEALTH: SUICIDAL BEHAVIOR (LIFETIME): NO

## 2023-10-07 SDOH — ECONOMIC STABILITY: FOOD INSECURITY: WITHIN THE PAST 12 MONTHS, THE FOOD YOU BOUGHT JUST DIDN'T LAST AND YOU DIDN'T HAVE MONEY TO GET MORE.: NEVER TRUE

## 2023-10-07 SDOH — HEALTH STABILITY: MENTAL HEALTH: HAVE YOU USED ANY SUBSTANCES (CANABIS, COCAINE, HEROIN, HALLUCINOGENS, INHALANTS, ETC.) IN THE PAST 12 MONTHS?: NO

## 2023-10-07 SDOH — SOCIAL STABILITY: SOCIAL INSECURITY: PHYSICAL ABUSE: DENIES

## 2023-10-07 SDOH — SOCIAL STABILITY: SOCIAL INSECURITY: ABUSE SCREEN: ADULT

## 2023-10-07 SDOH — HEALTH STABILITY: MENTAL HEALTH: CURRENT SMOKER: 0

## 2023-10-07 SDOH — SOCIAL STABILITY: SOCIAL INSECURITY: VERBAL ABUSE: DENIES

## 2023-10-07 ASSESSMENT — PAIN SCALES - GENERAL
PAINLEVEL_OUTOF10: 3
PAINLEVEL_OUTOF10: 5 - MODERATE PAIN
PAINLEVEL_OUTOF10: 0 - NO PAIN
PAINLEVEL_OUTOF10: 4
PAINLEVEL_OUTOF10: 0 - NO PAIN
PAINLEVEL_OUTOF10: 5 - MODERATE PAIN
PAINLEVEL_OUTOF10: 0 - NO PAIN
PAINLEVEL_OUTOF10: 4
PAINLEVEL_OUTOF10: 0 - NO PAIN
PAINLEVEL_OUTOF10: 3
PAINLEVEL_OUTOF10: 4
PAINLEVEL_OUTOF10: 0 - NO PAIN

## 2023-10-07 NOTE — NURSING NOTE
Cervical ripening balloon placed by Dr. Garcia at 1223; filled with 60cc sterile water; Secured to patient's leg with tape; Patient tolerated well

## 2023-10-07 NOTE — INDIVIDUALIZED OVERALL PLAN OF CARE NOTE
Patient doing well with balloon in place. Feeling intermittent contractions.         SVE: CRB in place  FHT: 120/mod/+accel/-decel  TOCO: q1-2min    A&P  - will continue with CRB, no second cytotec at this time due to contraction pattern of q1-2min  - will continue to monitor    T1DM  - q1 hour BG checks when npo otherwise 1 hour postprandial with self sliding scale using pump     gHTN  - dx by mild ranges greater than 4 hours apart  - HELLP Labs neg, P:C 0.18  - asx     Amine MD Jose

## 2023-10-07 NOTE — H&P
Obstetrical Admission History and Physical     Becca Irene is a 28 y.o.  at 37w0d. TIM: 10/28/2023, by Ultrasound. She has had prenatal care with MFM      Chief Complaint: Scheduled Induction    Assessment/Plan      IOL  - admit to L&D for induction of labor with CRB and miso  - will monitor for cervical change, will augment labor as required with pitocin and AROM following induction  - delivery plan: patient desires vaginal delivery, patient counseled on risks and benefits of induction of labor and possibility of c/s for failure of induction or non-reassuring fetal status  - type and screen and CBC on admission  - epidural as desired for pain control  - in setting of T1DM and size, discussed risk of shoulder dystocia including possible maneuvers and possible  nerve and bone injury    GBS positive   -intrapartum PCN indicated     gHTN  - dx by mild ranges greater than 4 hours apart  - HELLP Labs neg, P:C 0.18  - asx    T1DM  - Last Growth  EFW 3078g 93%, AC >99%, extrapolates to 3400g  - q1 hour BG checks when npo otherwise 1 hour postprandial with self sliding scale using pump  - Diagnosed at age 9.   - Last year hospitalization for DKA in s/o flu, denies recent DKA episodes or hospitalizations   - Baseline HELLP labs and p/c normal   - Last HA1c 6.8 (3/2023), repeat  4.9%   - Fetal echo wnl   - hypoglycemic aware   - Current pump tandem/dexcom suing control IQ     Ripening:  - usual end of pregnancy regimen     Active labor/decreased po intake (ie oxytocin initiation):  -IVF to include dextrose  -Turn control IQ off.  -Temporary basal on pump (basal rate at time of transition) with hourly adjustments PRN based on below:  BG <70        --> decrease pump basal by 20%, treat hypoglycemia (oral intake or D10)  BG    --> maintain current pump basal rate  -150 --> increase pump basal rate by 20%          --> contact MFM, may need insulin infusion     Postpartum:   - Resume  control IQ  - New settings:  Basal                     Carb Ratio         CF     [id number] 0.80          1:11            1:35  8975-4162 0.85          1:11            1:35   7071-8190 0.65          1:7              1:35  5570-0811 0.60          1:6              1:35  4628-7794 0.60          1:7              1:35     Target 130  Insulin DOA 5 hrs  Max bolus 25    Fetal status  - FHT Cat I   - CEFM    PPBC  - discussed options for postpartum birth control  - Patient desires POPs    Gila Garcia, PGY3  Seen and discussed with Dr. Yanes    Principal Problem:    37 weeks gestation of pregnancy      Pregnancy Problems (from 03/28/23 to present)       Problem Noted Resolved    37 weeks gestation of pregnancy 10/7/2023 by Gila Garcia MD No    Priority:  Medium      Gestational hypertension, third trimester 10/6/2023 by Ramirez Rincon MD No    Priority:  Medium      36 weeks gestation of pregnancy 10/5/2023 by Ramirez Rincon MD No    Priority:  Medium      Depression affecting pregnancy in third trimester, antepartum 10/5/2023 by Ramirez Rincon MD No    Priority:  Medium      Type 1 diabetes mellitus affecting pregnancy in third trimester, antepartum 10/2/2023 by Ramirez Rincon MD No    Priority:  Medium      Overview Signed 10/6/2023  7:29 PM by Ramirez Rincon MD     Intrapartum pump plan (if patient ultimately decides to use pump):  Current pump tandem/dexcom suing control IQ    Ripening:  - usual end of pregnancy regimen    Active labor/decreased po intake (ie oxytocin initiation):  -IVF to include dextrose  -Turn control IQ off.  -Temporary basal on pump (basal rate at time of transition) with hourly adjustments PRN based on below:  BG <70        --> decrease pump basal by 20%, treat hypoglycemia (oral intake or D10)  BG    --> maintain current pump basal rate  -150 --> increase pump basal rate by 20%          --> contact MFM, may need insulin infusion    Postpartum:   - Resume  control IQ  - New settings:  Basal                     Carb Ratio         CF     [id number] 0.80          1:11            1:35  6307-1547 0.85          1:11            1:35   4039-5257 0.65          1:7              1:35  5063-6469 0.60          1:6              1:35  2906-0848 0.60          1:7              1:35     Target 130  Insulin DOA 5 hrs  Max bolus 25                 Options for delivery have been discussed with the patient and she elects for an induction of labor.  Cervical ripening with cytotec, cervidil, other prostaglandin agents has been discussed.  Induction of labor with pitocin, amniotomy, cytotec, and cervical balloon have been discussed in detail. The risks, benefits, complications, alternatives, expected outcomes, potential problems during recuperation and recovery, and the risks of not performing the procedure were discussed with the patient. The patient stated understanding that the risks of delivery include, but are not limited to: death; reaction to medications; injury to bowel, bladder, ureters, uterus, cervix, vagina, and other pelvic and abdominal structures, infection; blood loss and possible need for transfusion; and potential need for surgery, including hysterectomy. The risks of injury to the infant during delivery were also discussed. All questions were answered. There was concurrence with the planned procedure, and the patient wanted to proceed.    Admit to inpatient status. I anticipate that this patient will require a stay exceeding at least 2 midnights for delivery and postpartum.  Induction of labor.  Management of pregnancy complications, as indicated.    Subjective      Becca Irene is a 28 y.o.  at 37w0d. TIM: 10/28/2023, by Ultrasound. She has had prenatal care with [facility]    Pregnancy notable for:   - T1DM, has G6 Dexcom CGM, last A1C 7.3% (2022)  - Depression, on fluoxetine 10mg   - gHTN    Ob: G1  Gyn: last pap () nml, denies hx of STI  PMH: as above   PSH:  wisdom teeth  Meds: CGM, Fluoxetine 10mg   Allergies: NKDA  Family Hx: reviewed, non contributory   Social: vap smoker, quit two weeks into pregnancy          Obstetrical History   OB History    Para Term  AB Living   1             SAB IAB Ectopic Multiple Live Births                  # Outcome Date GA Lbr Naveed/2nd Weight Sex Delivery Anes PTL Lv   1 Current                Past Medical History  Past Medical History:   Diagnosis Date    Cataract due to DM (CMS/HCC)     Depression affecting pregnancy     Emmetropia     Type 1 diabetes mellitus affecting pregnancy in third trimester, antepartum         Past Surgical History   History reviewed. No pertinent surgical history.    Social History  Social History     Tobacco Use    Smoking status: Never    Smokeless tobacco: Never   Substance Use Topics    Alcohol use: Not Currently     Substance and Sexual Activity   Drug Use Not Currently       Allergies  Patient has no known allergies.     Medications  Medications Prior to Admission   Medication Sig Dispense Refill Last Dose    acetone, urine, test (Ketostix) strip use as directed when blood sugar is over 250 or when ill       ammonium lactate (Amlactin) 12 % cream 1 Application       benzoyl peroxide (Brevoxyl) 4 % external liquid 1 Application       blood sugar diagnostic (Contour Next Test Strips) strip test bg 8-9 times per day       Dexcom G4 platinum transmitter (Dexcom G6 Transmitter) device Use one every 90 days 1 each 3 10/7/2023    glucagon (Glucagen) 1 mg injection inject 1 mg as directed for severe hypoglycemia       triamcinolone (Kenalog) 0.1 % cream 1 Application          Objective    Last Vitals  Temp Pulse Resp BP MAP O2 Sat   36.9 °C (98.4 °F) 77 18 126/81   99 %     Physical Examination  GENERAL: Examination reveals a well developed, well nourished, gravid female in no acute distress. She is alert and cooperative.  NECK: supple, no significant adenopathy  HEART: regular rate and rhythm, S1,  S2 normal, no murmur, click, rub or gallop  ABDOMEN: soft, gravid, nontender, nondistended, no abnormal masses, no epigastric pain  FHT: 130/mod/+accel/-decel  TOCO: quiet  SKIN: normal coloration and turgor, no rashes  NEUROLOGICAL: DTRs normal and symmetrical  PSYCHOLOGICAL: awake and alert; oriented to person, place, and time    Lab Review  Lab Results   Component Value Date    ABO A 10/07/2023    LABRH POS 10/07/2023    ABSCRN NEG 10/07/2023     Lab Results   Component Value Date    WBC 10.4 10/07/2023    HGB 11.4 (L) 10/07/2023    HCT 33.8 (L) 10/07/2023     10/07/2023

## 2023-10-08 LAB
GLUCOSE BLD MANUAL STRIP-MCNC: 104 MG/DL (ref 74–99)
GLUCOSE BLD MANUAL STRIP-MCNC: 104 MG/DL (ref 74–99)
GLUCOSE BLD MANUAL STRIP-MCNC: 107 MG/DL (ref 74–99)
GLUCOSE BLD MANUAL STRIP-MCNC: 107 MG/DL (ref 74–99)
GLUCOSE BLD MANUAL STRIP-MCNC: 110 MG/DL (ref 74–99)
GLUCOSE BLD MANUAL STRIP-MCNC: 110 MG/DL (ref 74–99)
GLUCOSE BLD MANUAL STRIP-MCNC: 111 MG/DL (ref 74–99)
GLUCOSE BLD MANUAL STRIP-MCNC: 114 MG/DL (ref 74–99)
GLUCOSE BLD MANUAL STRIP-MCNC: 115 MG/DL (ref 74–99)
GLUCOSE BLD MANUAL STRIP-MCNC: 119 MG/DL (ref 74–99)
GLUCOSE BLD MANUAL STRIP-MCNC: 121 MG/DL (ref 74–99)
GLUCOSE BLD MANUAL STRIP-MCNC: 124 MG/DL (ref 74–99)
GLUCOSE BLD MANUAL STRIP-MCNC: 129 MG/DL (ref 74–99)
GLUCOSE BLD MANUAL STRIP-MCNC: 133 MG/DL (ref 74–99)
GLUCOSE BLD MANUAL STRIP-MCNC: 134 MG/DL (ref 74–99)
GLUCOSE BLD MANUAL STRIP-MCNC: 149 MG/DL (ref 74–99)
GLUCOSE BLD MANUAL STRIP-MCNC: 82 MG/DL (ref 74–99)
GLUCOSE BLD MANUAL STRIP-MCNC: 84 MG/DL (ref 74–99)
GLUCOSE BLD MANUAL STRIP-MCNC: 88 MG/DL (ref 74–99)
GLUCOSE BLD MANUAL STRIP-MCNC: 92 MG/DL (ref 74–99)
GLUCOSE BLD MANUAL STRIP-MCNC: 96 MG/DL (ref 74–99)
GLUCOSE BLD MANUAL STRIP-MCNC: 97 MG/DL (ref 74–99)
GLUCOSE BLD MANUAL STRIP-MCNC: 98 MG/DL (ref 74–99)
GLUCOSE BLD MANUAL STRIP-MCNC: 99 MG/DL (ref 74–99)
POC FINGERSTICK BLOOD GLUCOSE: 111 MG/DL (ref 70–100)

## 2023-10-08 PROCEDURE — 51701 INSERT BLADDER CATHETER: CPT

## 2023-10-08 PROCEDURE — 2500000004 HC RX 250 GENERAL PHARMACY W/ HCPCS (ALT 636 FOR OP/ED)

## 2023-10-08 PROCEDURE — 2500000005 HC RX 250 GENERAL PHARMACY W/O HCPCS: Performed by: ANESTHESIOLOGY

## 2023-10-08 PROCEDURE — 2580000001 HC RX 258 IV SOLUTIONS: Performed by: STUDENT IN AN ORGANIZED HEALTH CARE EDUCATION/TRAINING PROGRAM

## 2023-10-08 PROCEDURE — 2500000005 HC RX 250 GENERAL PHARMACY W/O HCPCS: Performed by: STUDENT IN AN ORGANIZED HEALTH CARE EDUCATION/TRAINING PROGRAM

## 2023-10-08 PROCEDURE — 10H07YZ INSERTION OF OTHER DEVICE INTO PRODUCTS OF CONCEPTION, VIA NATURAL OR ARTIFICIAL OPENING: ICD-10-PCS | Performed by: OBSTETRICS & GYNECOLOGY

## 2023-10-08 PROCEDURE — 82947 ASSAY GLUCOSE BLOOD QUANT: CPT

## 2023-10-08 PROCEDURE — 1120000001 HC OB PRIVATE ROOM DAILY

## 2023-10-08 PROCEDURE — 82962 GLUCOSE BLOOD TEST: CPT

## 2023-10-08 PROCEDURE — 10907ZC DRAINAGE OF AMNIOTIC FLUID, THERAPEUTIC FROM PRODUCTS OF CONCEPTION, VIA NATURAL OR ARTIFICIAL OPENING: ICD-10-PCS | Performed by: OBSTETRICS & GYNECOLOGY

## 2023-10-08 PROCEDURE — 2500000004 HC RX 250 GENERAL PHARMACY W/ HCPCS (ALT 636 FOR OP/ED): Performed by: STUDENT IN AN ORGANIZED HEALTH CARE EDUCATION/TRAINING PROGRAM

## 2023-10-08 RX ORDER — LIDOCAINE HCL/EPINEPHRINE/PF 2%-1:200K
VIAL (ML) INJECTION AS NEEDED
Status: DISCONTINUED | OUTPATIENT
Start: 2023-10-07 | End: 2023-10-09

## 2023-10-08 RX ORDER — DEXTROSE, SODIUM CHLORIDE, SODIUM LACTATE, POTASSIUM CHLORIDE, AND CALCIUM CHLORIDE 5; .6; .31; .03; .02 G/100ML; G/100ML; G/100ML; G/100ML; G/100ML
125 INJECTION, SOLUTION INTRAVENOUS CONTINUOUS
Status: DISCONTINUED | OUTPATIENT
Start: 2023-10-08 | End: 2023-10-11 | Stop reason: HOSPADM

## 2023-10-08 RX ORDER — FENTANYL/BUPIVACAINE/NS/PF 2MCG/ML-.1
PLASTIC BAG, INJECTION (ML) INJECTION AS NEEDED
Status: DISCONTINUED | OUTPATIENT
Start: 2023-10-07 | End: 2023-10-09

## 2023-10-08 RX ADMIN — Medication 2 MILLI-UNITS/MIN: at 06:23

## 2023-10-08 RX ADMIN — Medication 14 ML/HR: at 00:06

## 2023-10-08 RX ADMIN — PENICILLIN G 3 MILLION UNITS: 3000000 INJECTION, SOLUTION INTRAVENOUS at 11:55

## 2023-10-08 RX ADMIN — Medication 5 ML: at 00:01

## 2023-10-08 RX ADMIN — PENICILLIN G 3 MILLION UNITS: 3000000 INJECTION, SOLUTION INTRAVENOUS at 04:13

## 2023-10-08 RX ADMIN — PENICILLIN G 3 MILLION UNITS: 3000000 INJECTION, SOLUTION INTRAVENOUS at 23:31

## 2023-10-08 RX ADMIN — PENICILLIN G 3 MILLION UNITS: 3000000 INJECTION, SOLUTION INTRAVENOUS at 07:17

## 2023-10-08 RX ADMIN — SODIUM CHLORIDE, SODIUM LACTATE, POTASSIUM CHLORIDE, CALCIUM CHLORIDE AND DEXTROSE MONOHYDRATE 125 ML/HR: 5; 600; 310; 30; 20 INJECTION, SOLUTION INTRAVENOUS at 04:14

## 2023-10-08 RX ADMIN — PENICILLIN G 3 MILLION UNITS: 3000000 INJECTION, SOLUTION INTRAVENOUS at 01:28

## 2023-10-08 RX ADMIN — SODIUM CHLORIDE, POTASSIUM CHLORIDE, SODIUM LACTATE AND CALCIUM CHLORIDE 125 ML/HR: 600; 310; 30; 20 INJECTION, SOLUTION INTRAVENOUS at 01:28

## 2023-10-08 RX ADMIN — PENICILLIN G 3 MILLION UNITS: 3000000 INJECTION, SOLUTION INTRAVENOUS at 17:32

## 2023-10-08 RX ADMIN — SODIUM CHLORIDE, SODIUM LACTATE, POTASSIUM CHLORIDE, CALCIUM CHLORIDE AND DEXTROSE MONOHYDRATE 125 ML/HR: 5; 600; 310; 30; 20 INJECTION, SOLUTION INTRAVENOUS at 21:53

## 2023-10-08 ASSESSMENT — PAIN SCALES - GENERAL

## 2023-10-08 NOTE — INDIVIDUALIZED OVERALL PLAN OF CARE NOTE
Unable to augment with Pit due to frequent ctx. IUPC placed to check if inadequate. SVE 3/50/-3. IUPC placed. 150/mod/+accel/-decel, ctx q 2-3 min, cat 1 FHT, cEFM. T1DM, on pump, BG .     Miranda Santos MD

## 2023-10-08 NOTE — ANESTHESIA PREPROCEDURE EVALUATION
Patient: Becca Irene    Evaluation Method: In-person visit    Procedure Information    Date: 10/07/23  Procedure: Labor Analgesia         Relevant Problems   Anesthesia (within normal limits)      Cardiovascular (within normal limits)      Endocrine   (+) Type 1 diabetes mellitus (CMS/HCC)   (+) Type 1 diabetes mellitus affecting pregnancy in third trimester, antepartum   (+) Type 1 diabetes mellitus with hyperglycemia (CMS/HCC)      GI   (+) Gastroesophageal reflux disease      /Renal (within normal limits)      Neuro/Psych   (+) Anxiety   (+) Depression affecting pregnancy in third trimester, antepartum      Pulmonary (within normal limits)      GI/Hepatic (within normal limits)      Hematology (within normal limits)      Musculoskeletal (within normal limits)      Eyes, Ears, Nose, and Throat (within normal limits)      Infectious Disease (within normal limits)       Clinical information reviewed:   Tobacco  Allergies  Meds   Med Hx  Surg Hx   Fam Hx          NPO Detail:  No data recorded     OB/Gyn Evaluation    Present Pregnancy    Patient is pregnant now.   Obstetric History                Physical Exam    Airway  Mallampati: II  TM distance: >3 FB  Neck ROM: full     Cardiovascular   Rhythm: regular     Dental    Pulmonary   Breath sounds clear to auscultation     Abdominal            Anesthesia Plan    ASA 2     epidural     The patient is not a current smoker.  Patient was not previously instructed to abstain from smoking on day of procedure.  Patient did not smoke on day of procedure.    Anesthetic plan and risks discussed with patient.

## 2023-10-08 NOTE — PROGRESS NOTES
May proceed with pitocin augmentation given reassuring FHTs and unchanged cervical exam.  Titrate to adequacy.     Yanet Villarreal MD

## 2023-10-08 NOTE — INDIVIDUALIZED OVERALL PLAN OF CARE NOTE
Patient doing well with epidural and pitocin infusing. Feeling intermittent contractions.       SVE: 3/60/-3  FHT: 130/mod/+accel/-decel  TOCO: q2min    A&P    IOL  - continue pitocin, currently at 20   - discussed possibility of protracted labor and increased risks of infection and bleeding after 18-20 hours post rupture and pitocin together which will be at 1-3AM 10/9. Discussed possibility of large baby or abnormal fetal position, encouraging position changes, patient expressed understanding  - Cat 1 tracing, continue to monitor for cervical change    gHTN  - dx by mild ranges greater than 4 hours apart  - HELLP Labs neg, P:C 0.18  - asx, normotensive     T1DM  - q1 hour BG checks when npo   - Current pump tandem/dexcom suing control IQ:   Active labor/or decreased po intake (ie oxytocin initiation):  -IVF to include dextrose  -Turn control IQ off.  -Temporary basal on pump (basal rate at time of transition) with hourly adjustments PRN based on below:  BG <70        --> decrease pump basal by 20%, treat hypoglycemia (oral intake or D10)  BG    --> maintain current pump basal rate  -150 --> increase pump basal rate by 20%          --> contact MFM, may need insulin infusion     Gila Garcia MD   7 (severe pain)

## 2023-10-08 NOTE — ANESTHESIA PROCEDURE NOTES
Epidural Block    Patient location during procedure: OB  Start time: 10/7/2023 11:30 PM  End time: 10/8/2023 12:12 AM  Reason for block: labor analgesia  Staffing  Performed: resident and attending   Authorized by: Lolita King MD    Performed by: Suzette Wang MD    Preanesthetic Checklist  Completed: patient identified, IV checked, risks and benefits discussed, surgical consent, pre-op evaluation, timeout performed and sterile techniques followed  Block Timeout  RN/Licensed healthcare professional reads aloud to the Anesthesia provider and entire team: Patient identity, procedure with side and site, patient position, and as applicable the availability of implants/special equipment/special requirements.  Patient on coagulant treatment: no  Timeout performed at: 10/7/2023 11:31 PM  Block Placement  Patient position: sitting  Prep: ChloraPrep  Sterility prep: gloves, drape, mask and cap  Sedation level: no sedation  Patient monitoring: blood pressure, continuous pulse oximetry and heart rate  Approach: midline  Local numbing: lidocaine 1% to skin and subcutaneous tissues  Vertebral space: lumbar  Lumbar location: L3-L4  Epidural  Loss of resistance technique: saline  Guidance: landmark technique        Needle  Needle insertion depth: 7 cm  Catheter size: 18 G  Catheter at skin depth: 12 cm    Test dose: lidocaine 1.5% with epinephrine 1-to-200,000  Test dose given at 10/7/2023 11:55 PM  Test dose: lidocaine 1.5% with epinephrine 1-to-200,000  Test dose result: no positive test dose    PCEA  Medication concentration used: 0.044% Bupivacaine with 1.25 mcg/mL Fentanyl and 1:518037 Epinephrine  Dose (mL): 10  Lockout (minutes): 15  1-Hour Limit (boluses/hr): 54  Basal Rate: 14        Assessment  Sensory level: T6  Number of attempts: 1  Events: no positive test dose  Procedure assessment: patient tolerated procedure well with no immediate complications

## 2023-10-08 NOTE — PROGRESS NOTES
Antepartum Progress Note    Assessment/Plan   Becca Irene is a 28 y.o.  at 37w1d. TIM: 10/28/2023, by Ultrasound.     Antepartum Progress Note    Assessment/Plan   Becca Irene is a 28 y.o.  at 37w1d. TIM: 10/28/2023, by Ultrasound. Undergoing IOL in the setting of gHTN and T1DM.     IOL  - s/p AROM 0130, IUPC in place for monitoring difficulties   - GBS +, continue PCN  - Hgb 11.4 on admission ,T&C x1U for risk factors  - epidural     gHTN  - dx by mild ranges greater than 4 hours apart  - HELLP Labs neg, P:C 0.18  - asx     T1DM  - Last Growth  EFW 3078g 93%, AC >99%, extrapolates to 3400g  - q1 hour BG checks when npo   - Sliding scale per pump  - Fetal echo wnl   - Current pump tandem/dexcom suing control IQ:   Active labor/or decreased po intake (ie oxytocin initiation):  -IVF to include dextrose  -Turn control IQ off.  -Temporary basal on pump (basal rate at time of transition) with hourly adjustments PRN based on below:  BG <70        --> decrease pump basal by 20%, treat hypoglycemia (oral intake or D10)  BG    --> maintain current pump basal rate  -150 --> increase pump basal rate by 20%          --> contact MFM, may need insulin infusion     Postpartum:   - Resume control IQ  - New settings:  Basal                     Carb Ratio         CF     7121-1986 0.80          1:11            1:35  [id number] 0.85          1:11            1:35   6431-4773 0.65          1:7              1:35  3697-6665 0.60          1:6              1:35  3780-1777 0.60          1:7              1:35     Target 130  Insulin DOA 5 hrs  Max bolus 25     Fetal status  - FHT Cat I   - CEFM     PPBC  - discussed options for postpartum birth control  - Patient desires POPs    D/w Dr. Jose Garcia MD      Principal Problem:    37 weeks gestation of pregnancy  Active Problems:    Gastroesophageal reflux disease    Pregnancy Problems (from 24 to present)       Problem Noted Resolved    37  weeks gestation of pregnancy 10/7/2023 by Gila Garcia MD No    Priority:  Medium      Gestational hypertension, third trimester 10/6/2023 by Ramirez Rincon MD No    Priority:  Medium      36 weeks gestation of pregnancy 10/5/2023 by Ramirez Rincon MD No    Priority:  Medium      Depression affecting pregnancy in third trimester, antepartum 10/5/2023 by Ramirez Rincon MD No    Priority:  Medium      Type 1 diabetes mellitus affecting pregnancy in third trimester, antepartum 10/2/2023 by Ramirez Rincon MD No    Priority:  Medium      Overview Signed 10/6/2023  7:29 PM by Ramirez Rincon MD                        Subjective   Patient not feeling contractions with epidural and pitocin infusing.     Objective   Allergies:   Patient has no known allergies.    Last Vitals:  Temp Pulse Resp BP MAP Pulse Ox   36.2 °C (97.2 °F) 81 18 117/69   98 %     Vitals Min/Max Last 24 Hours:  Temp  Min: 36.1 °C (97 °F)  Max: 37.2 °C (99 °F)  Pulse  Min: 61  Max: 109  Resp  Min: 16  Max: 18  BP  Min: 107/58  Max: 137/85    Physical Exam:  General: Well appearing, alert  HEENT: normocephalic, EOMI, clear sclera  Cardio: Warm and well perfused  Resp: breathing comfortably on room air  Abd: gravid  Neuro: grossly intact, no focal deficits  Extremities: full ROM, no calf tenderness  Psych: A&O x3, appropriate mood and affect    SVE: deferred  FHT: 150/mod/+accel/-decel  TOCO: q3min    Lab Data:  Lab Results   Component Value Date    WBC 10.4 10/07/2023    HGB 11.4 (L) 10/07/2023    HCT 33.8 (L) 10/07/2023     10/07/2023           Principal Problem:    37 weeks gestation of pregnancy  Active Problems:    Gastroesophageal reflux disease

## 2023-10-08 NOTE — PROGRESS NOTES
Antepartum Progress Note    Assessment/Plan   Becca Irene is a 28 y.o.  at 37w0d. TIM: 10/28/2023, by Ultrasound. Undergoing IOL in the setting of gHTN and T1DM.     IOL  - CRB in place, s/p cytotec x1, gladis frequently  - for AROM and pitocin when appropriate  - GBS +, continue PCN  - Hgb 11.4 on admission ,T&C x1U for risk factors  - epidural as desired for pain control  - in setting of T1DM and size, discussed risk of shoulder dystocia including possible maneuvers and possible  nerve and bone injury    gHTN  - dx by mild ranges greater than 4 hours apart  - HELLP Labs neg, P:C 0.18  - asx     T1DM  - Last Growth  EFW 3078g 93%, AC >99%, extrapolates to 3400g  - continue 1hr post prandial BG checks while ripening, for q1 hour BG checks when npo   - Sliding scale per pump  - Last year hospitalization for DKA in s/o flu, denies recent DKA episodes or hospitalizations   - Last HA1c 6.8 (3/2023), repeat  4.9%   - Fetal echo wnl   - hypoglycemic aware   - Current pump tandem/dexcom suing control IQ:    Ripening:  - usual end of pregnancy regimen     Active labor/or decreased po intake (ie oxytocin initiation):  -IVF to include dextrose  -Turn control IQ off.  -Temporary basal on pump (basal rate at time of transition) with hourly adjustments PRN based on below:  BG <70        --> decrease pump basal by 20%, treat hypoglycemia (oral intake or D10)  BG    --> maintain current pump basal rate  -150 --> increase pump basal rate by 20%          --> contact MFM, may need insulin infusion     Postpartum:   - Resume control IQ  - New settings:  Basal                     Carb Ratio         CF     [id number] 0.80          1:11            1:35  0741-8329 0.85          1:11            1:35   [id number] 0.65          1:7              1:35  2829-7064 0.60          1:6              1:35  7445-1204 0.60          1:7              1:35     Target 130  Insulin DOA 5 hrs  Max bolus 25     Fetal  status  - FHT Cat I   - CEFM     [facility]  - discussed options for postpartum birth control  - Patient desires POPs    D/w Dr. Flavio Pierre MD      Principal Problem:    37 weeks gestation of pregnancy  Active Problems:    Gastroesophageal reflux disease    Pregnancy Problems (from 03/28/23 to present)       Problem Noted Resolved    37 weeks gestation of pregnancy 10/7/2023 by Gila Garcia MD No    Priority:  Medium      Gestational hypertension, third trimester 10/6/2023 by Ramirez Rincon MD No    Priority:  Medium      36 weeks gestation of pregnancy 10/5/2023 by Ramirez Rincon MD No    Priority:  Medium      Depression affecting pregnancy in third trimester, antepartum 10/5/2023 by Ramirez Rincon MD No    Priority:  Medium      Type 1 diabetes mellitus affecting pregnancy in third trimester, antepartum 10/2/2023 by Ramirez Rincon MD No    Priority:  Medium      Overview Signed 10/6/2023  7:29 PM by Ramirez Rincon MD     Intrapartum pump plan (if patient ultimately decides to use pump):  Current pump tandem/dexcom suing control IQ    Ripening:  - usual end of pregnancy regimen    Active labor/decreased po intake (ie oxytocin initiation):  -IVF to include dextrose  -Turn control IQ off.  -Temporary basal on pump (basal rate at time of transition) with hourly adjustments PRN based on below:  BG <70        --> decrease pump basal by 20%, treat hypoglycemia (oral intake or D10)  BG    --> maintain current pump basal rate  -150 --> increase pump basal rate by 20%          --> contact MFM, may need insulin infusion    Postpartum:   - Resume control IQ  - New settings:  Basal                     Carb Ratio         CF     [id number] 0.80          1:11            1:35  8127-8165 0.85          1:11            1:35   0400-1813 0.65          1:7              1:35  8797-8720 0.60          1:6              1:35  4574-3038 0.60          1:7              1:35     Target  130  Insulin DOA 5 hrs  Max bolus 25                   Subjective   Patient having low back pain with crb in place. Gloria every 2min with cytotecx 1. S/p fentanyl dose x1, desires flexeril. Denies HA, vision changes, CP, SOB, or RUQ pain.    Objective   Allergies:   Patient has no known allergies.    Last Vitals:  Temp Pulse Resp BP MAP Pulse Ox   36.5 °C (97.7 °F) 67 16 137/85   97 %     Vitals Min/Max Last 24 Hours:  Temp  Min: 36.5 °C (97.7 °F)  Max: 37 °C (98.6 °F)  Pulse  Min: 61  Max: 77  Resp  Min: 16  Max: 18  BP  Min: 121/79  Max: 137/85    Physical Exam:  General: Well appearing, alert  HEENT: normocephalic, EOMI, clear sclera  Cardio: Warm and well perfused  Resp: breathing comfortably on room air  Abd: gravid  Neuro: grossly intact, no focal deficits  Extremities: full ROM, no calf tenderness  Psych: A&O x3, appropriate mood and affect    SVE: deferred  FHT:     Lab Data:  Lab Results   Component Value Date    WBC 10.4 10/07/2023    HGB 11.4 (L) 10/07/2023    HCT 33.8 (L) 10/07/2023     10/07/2023

## 2023-10-08 NOTE — INDIVIDUALIZED OVERALL PLAN OF CARE NOTE
Labor check    S: Patient resting comfortably with epidural infusing. No longer feeling ctx. Denies HA, vision changes, CP, SOB, or RUQ pain.    SVE: 2.5/60/-3, AROM for clear fluid  FHT: 150/mod/+accel/-decel  [place]: q 1-2min    A/P: Becca is a 28 y.o.  at 37w1d undergoing IOL in the setting of gHTN and T1DM.     IOL  - s/p CRB and cytoX1, contracing q1-2min without pitocin  - s/p AROM at 0130  - GBS +, continue PCN  - Hgb 11.4 on admission ,T&C x1U for risk factors  - epidural in place     gHTN  - dx by mild ranges greater than 4 hours apart  - HELLP Labs neg, P:C 0.18  - asx     T1DM  - Last Growth  EFW 3078g 93%, AC >99%, extrapolates to 3400g  - fluids D5 LR at 125  - Transition to q1hr POCT now that not pt is not eating much  - Pump settings transitioned to:  -IQ control off  -Basal with hourly adjustments PRN based on below:  BG <70        --> decrease pump basal by 20%, treat hypoglycemia (oral intake or D10)  BG    --> maintain current pump basal rate  -150 --> increase pump basal rate by 20%          --> contact MFM, may need insulin infusion      Fetal status  - FHT Cat I   - CEFM    Doris Pierre MD

## 2023-10-08 NOTE — INDIVIDUALIZED OVERALL PLAN OF CARE NOTE
Patient doing well with epidural and pitocin infusing. Feeling intermittent contractions.         SVE: 3/60/-3  FHT: 140/mod/+accel/-decel  [place]: q2min    A&P    IOL  - Continue to monitor for cervical change  - continue pitocin, encouraged position changes  - discussed AROM and pit together for 5 hours    gHTN  - dx by mild ranges greater than 4 hours apart  - HELLP Labs neg, P:C 0.18  - asx     T1DM  - q1 hour BG checks when npo   - Current pump tandem/dexcom suing control IQ:   Active labor/or decreased po intake (ie oxytocin initiation):  -IVF to include dextrose  -Turn control IQ off.  -Temporary basal on pump (basal rate at time of transition) with hourly adjustments PRN based on below:  BG <70        --> decrease pump basal by 20%, treat hypoglycemia (oral intake or D10)  BG    --> maintain current pump basal rate  -150 --> increase pump basal rate by 20%          --> contact MFM, may need insulin infusion     Gila Garcia MD

## 2023-10-09 PROBLEM — L70.0 CYSTIC ACNE VULGARIS: Status: RESOLVED | Noted: 2018-04-16 | Resolved: 2023-10-09

## 2023-10-09 PROBLEM — Z96.41 INSULIN PUMP IN PLACE: Status: RESOLVED | Noted: 2023-09-25 | Resolved: 2023-10-09

## 2023-10-09 PROBLEM — L72.0 EPIDERMAL CYST: Status: RESOLVED | Noted: 2018-04-16 | Resolved: 2023-10-09

## 2023-10-09 PROBLEM — Z3A.36 36 WEEKS GESTATION OF PREGNANCY (HHS-HCC): Status: RESOLVED | Noted: 2023-10-05 | Resolved: 2023-10-09

## 2023-10-09 PROBLEM — Z3A.37 37 WEEKS GESTATION OF PREGNANCY (HHS-HCC): Status: RESOLVED | Noted: 2023-10-07 | Resolved: 2023-10-09

## 2023-10-09 PROBLEM — O99.343 DEPRESSION AFFECTING PREGNANCY IN THIRD TRIMESTER, ANTEPARTUM (HHS-HCC): Status: RESOLVED | Noted: 2023-10-05 | Resolved: 2023-10-09

## 2023-10-09 PROBLEM — F32.A DEPRESSION: Status: ACTIVE | Noted: 2023-10-09

## 2023-10-09 PROBLEM — Z3A.37 37 WEEKS GESTATION OF PREGNANCY (HHS-HCC): Status: ACTIVE | Noted: 2023-10-07

## 2023-10-09 PROBLEM — E10.65 TYPE 1 DIABETES MELLITUS WITH HYPERGLYCEMIA (MULTI): Status: RESOLVED | Noted: 2023-09-25 | Resolved: 2023-10-09

## 2023-10-09 PROBLEM — F32.A DEPRESSION AFFECTING PREGNANCY IN THIRD TRIMESTER, ANTEPARTUM (HHS-HCC): Status: RESOLVED | Noted: 2023-10-05 | Resolved: 2023-10-09

## 2023-10-09 PROBLEM — L23.7 ALLERGIC CONTACT DERMATITIS DUE TO PLANTS, EXCEPT FOOD: Status: RESOLVED | Noted: 2018-04-16 | Resolved: 2023-10-09

## 2023-10-09 LAB
ABO GROUP (TYPE) IN BLOOD: NORMAL
ANTIBODY SCREEN: NORMAL
APTT PPP: 25 SECONDS (ref 27–38)
BASE EXCESS BLDCOV CALC-SCNC: -6 MMOL/L (ref -8.1–-0.5)
BLOOD EXPIRATION DATE: NORMAL
BLOOD EXPIRATION DATE: NORMAL
BODY TEMPERATURE: 37 DEGREES CELSIUS
DISPENSE STATUS: NORMAL
DISPENSE STATUS: NORMAL
ERYTHROCYTE [DISTWIDTH] IN BLOOD BY AUTOMATED COUNT: 13.4 % (ref 11.5–14.5)
FIBRINOGEN PPP-MCNC: 332 MG/DL (ref 200–400)
GLUCOSE BLD MANUAL STRIP-MCNC: 109 MG/DL (ref 74–99)
GLUCOSE BLD MANUAL STRIP-MCNC: 109 MG/DL (ref 74–99)
GLUCOSE BLD MANUAL STRIP-MCNC: 114 MG/DL (ref 74–99)
GLUCOSE BLD MANUAL STRIP-MCNC: 122 MG/DL (ref 74–99)
GLUCOSE BLD MANUAL STRIP-MCNC: 145 MG/DL (ref 74–99)
GLUCOSE BLD MANUAL STRIP-MCNC: 68 MG/DL (ref 74–99)
GLUCOSE BLD MANUAL STRIP-MCNC: 88 MG/DL (ref 74–99)
GLUCOSE BLD MANUAL STRIP-MCNC: 89 MG/DL (ref 74–99)
GLUCOSE BLD MANUAL STRIP-MCNC: 91 MG/DL (ref 74–99)
GLUCOSE BLD MANUAL STRIP-MCNC: 92 MG/DL (ref 74–99)
GLUCOSE BLD MANUAL STRIP-MCNC: 97 MG/DL (ref 74–99)
HCO3 BLDCOV-SCNC: 19.6 MMOL/L (ref 16–26)
HCT VFR BLD AUTO: 26.6 % (ref 36–46)
HGB BLD-MCNC: 9 G/DL (ref 12–16)
INHALED O2 CONCENTRATION: 21 %
INR PPP: 1 (ref 0.9–1.1)
MCH RBC QN AUTO: 31.3 PG (ref 26–34)
MCHC RBC AUTO-ENTMCNC: 33.8 G/DL (ref 32–36)
MCV RBC AUTO: 92 FL (ref 80–100)
NRBC BLD-RTO: 0 /100 WBCS (ref 0–0)
OXYHGB MFR BLDCOV: 70.5 % (ref 94–98)
PCO2 BLDCOV: 38 MM HG (ref 22–53)
PH BLDCOV: 7.32 PH (ref 7.19–7.47)
PLATELET # BLD AUTO: 176 X10*3/UL (ref 150–450)
PMV BLD AUTO: 11.5 FL (ref 7.5–11.5)
PO2 BLDCOV: 39 MM HG (ref 13–37)
PRODUCT BLOOD TYPE: 6200
PRODUCT BLOOD TYPE: 6200
PRODUCT CODE: NORMAL
PRODUCT CODE: NORMAL
PROTHROMBIN TIME: 11.6 SECONDS (ref 9.8–12.8)
RBC # BLD AUTO: 2.88 X10*6/UL (ref 4–5.2)
RH FACTOR (ANTIGEN D): NORMAL
SAO2 % BLDCOV: 73 % (ref 16–84)
UNIT ABO: NORMAL
UNIT ABO: NORMAL
UNIT NUMBER: NORMAL
UNIT NUMBER: NORMAL
UNIT RH: NORMAL
UNIT RH: NORMAL
UNIT VOLUME: 272
UNIT VOLUME: 322
WBC # BLD AUTO: 22.6 X10*3/UL (ref 4.4–11.3)

## 2023-10-09 PROCEDURE — 3700000014 HC AN EPIDURAL BLOCK CHARGE: Performed by: STUDENT IN AN ORGANIZED HEALTH CARE EDUCATION/TRAINING PROGRAM

## 2023-10-09 PROCEDURE — 2500000004 HC RX 250 GENERAL PHARMACY W/ HCPCS (ALT 636 FOR OP/ED): Performed by: STUDENT IN AN ORGANIZED HEALTH CARE EDUCATION/TRAINING PROGRAM

## 2023-10-09 PROCEDURE — 96372 THER/PROPH/DIAG INJ SC/IM: CPT | Performed by: STUDENT IN AN ORGANIZED HEALTH CARE EDUCATION/TRAINING PROGRAM

## 2023-10-09 PROCEDURE — 85384 FIBRINOGEN ACTIVITY: CPT

## 2023-10-09 PROCEDURE — 2500000005 HC RX 250 GENERAL PHARMACY W/O HCPCS: Performed by: STUDENT IN AN ORGANIZED HEALTH CARE EDUCATION/TRAINING PROGRAM

## 2023-10-09 PROCEDURE — 88307 TISSUE EXAM BY PATHOLOGIST: CPT | Performed by: STUDENT IN AN ORGANIZED HEALTH CARE EDUCATION/TRAINING PROGRAM

## 2023-10-09 PROCEDURE — 85027 COMPLETE CBC AUTOMATED: CPT | Performed by: STUDENT IN AN ORGANIZED HEALTH CARE EDUCATION/TRAINING PROGRAM

## 2023-10-09 PROCEDURE — 82947 ASSAY GLUCOSE BLOOD QUANT: CPT

## 2023-10-09 PROCEDURE — P9017 PLASMA 1 DONOR FRZ W/IN 8 HR: HCPCS

## 2023-10-09 PROCEDURE — 7100000016 HC LABOR RECOVERY PER HOUR: Performed by: STUDENT IN AN ORGANIZED HEALTH CARE EDUCATION/TRAINING PROGRAM

## 2023-10-09 PROCEDURE — 82805 BLOOD GASES W/O2 SATURATION: CPT | Performed by: STUDENT IN AN ORGANIZED HEALTH CARE EDUCATION/TRAINING PROGRAM

## 2023-10-09 PROCEDURE — 2500000004 HC RX 250 GENERAL PHARMACY W/ HCPCS (ALT 636 FOR OP/ED)

## 2023-10-09 PROCEDURE — 59514 CESAREAN DELIVERY ONLY: CPT | Performed by: STUDENT IN AN ORGANIZED HEALTH CARE EDUCATION/TRAINING PROGRAM

## 2023-10-09 PROCEDURE — 86901 BLOOD TYPING SEROLOGIC RH(D): CPT | Performed by: STUDENT IN AN ORGANIZED HEALTH CARE EDUCATION/TRAINING PROGRAM

## 2023-10-09 PROCEDURE — 2500000004 HC RX 250 GENERAL PHARMACY W/ HCPCS (ALT 636 FOR OP/ED): Performed by: NURSE ANESTHETIST, CERTIFIED REGISTERED

## 2023-10-09 PROCEDURE — 51701 INSERT BLADDER CATHETER: CPT

## 2023-10-09 PROCEDURE — 7210000002 HC LABOR PER HOUR

## 2023-10-09 PROCEDURE — 36415 COLL VENOUS BLD VENIPUNCTURE: CPT | Performed by: STUDENT IN AN ORGANIZED HEALTH CARE EDUCATION/TRAINING PROGRAM

## 2023-10-09 PROCEDURE — 2720000007 HC OR 272 NO HCPCS: Performed by: STUDENT IN AN ORGANIZED HEALTH CARE EDUCATION/TRAINING PROGRAM

## 2023-10-09 PROCEDURE — 2720000007 HC OR 272 NO HCPCS

## 2023-10-09 PROCEDURE — 7100000016 HC LABOR RECOVERY PER HOUR

## 2023-10-09 PROCEDURE — 3700000018 HC OB ANESTHESIA C-SECTION: Performed by: STUDENT IN AN ORGANIZED HEALTH CARE EDUCATION/TRAINING PROGRAM

## 2023-10-09 PROCEDURE — P9045 ALBUMIN (HUMAN), 5%, 250 ML: HCPCS | Mod: JZ | Performed by: NURSE ANESTHETIST, CERTIFIED REGISTERED

## 2023-10-09 PROCEDURE — 1100000001 HC PRIVATE ROOM DAILY

## 2023-10-09 PROCEDURE — 2500000005 HC RX 250 GENERAL PHARMACY W/O HCPCS: Performed by: NURSE ANESTHETIST, CERTIFIED REGISTERED

## 2023-10-09 PROCEDURE — 7220000003 HC JADA OB SPECIAL CARE, 4 HOURS

## 2023-10-09 PROCEDURE — P9016 RBC LEUKOCYTES REDUCED: HCPCS

## 2023-10-09 PROCEDURE — 2780000003 HC OR 278 NO HCPCS: Performed by: STUDENT IN AN ORGANIZED HEALTH CARE EDUCATION/TRAINING PROGRAM

## 2023-10-09 PROCEDURE — 85027 COMPLETE CBC AUTOMATED: CPT

## 2023-10-09 PROCEDURE — 2500000001 HC RX 250 WO HCPCS SELF ADMINISTERED DRUGS (ALT 637 FOR MEDICARE OP): Performed by: NURSE ANESTHETIST, CERTIFIED REGISTERED

## 2023-10-09 PROCEDURE — 88307 TISSUE EXAM BY PATHOLOGIST: CPT | Mod: TC,SUR,CMCLAB | Performed by: STUDENT IN AN ORGANIZED HEALTH CARE EDUCATION/TRAINING PROGRAM

## 2023-10-09 PROCEDURE — 85730 THROMBOPLASTIN TIME PARTIAL: CPT

## 2023-10-09 PROCEDURE — 2580000001 HC RX 258 IV SOLUTIONS: Performed by: STUDENT IN AN ORGANIZED HEALTH CARE EDUCATION/TRAINING PROGRAM

## 2023-10-09 PROCEDURE — 0W3R7ZZ CONTROL BLEEDING IN GENITOURINARY TRACT, VIA NATURAL OR ARTIFICIAL OPENING: ICD-10-PCS | Performed by: STUDENT IN AN ORGANIZED HEALTH CARE EDUCATION/TRAINING PROGRAM

## 2023-10-09 RX ORDER — AZITHROMYCIN 100 MG/ML
INJECTION, POWDER, LYOPHILIZED, FOR SOLUTION INTRAVENOUS AS NEEDED
Status: DISCONTINUED | OUTPATIENT
Start: 2023-10-09 | End: 2023-10-09

## 2023-10-09 RX ORDER — MORPHINE SULFATE 0.5 MG/ML
INJECTION, SOLUTION EPIDURAL; INTRATHECAL; INTRAVENOUS AS NEEDED
Status: DISCONTINUED | OUTPATIENT
Start: 2023-10-09 | End: 2023-10-09

## 2023-10-09 RX ORDER — ALBUMIN HUMAN 50 G/1000ML
SOLUTION INTRAVENOUS AS NEEDED
Status: DISCONTINUED | OUTPATIENT
Start: 2023-10-09 | End: 2023-10-09

## 2023-10-09 RX ORDER — LIDOCAINE 560 MG/1
1 PATCH PERCUTANEOUS; TOPICAL; TRANSDERMAL
Status: DISCONTINUED | OUTPATIENT
Start: 2023-10-09 | End: 2023-10-11 | Stop reason: HOSPADM

## 2023-10-09 RX ORDER — TRANEXAMIC ACID 100 MG/ML
1000 INJECTION, SOLUTION INTRAVENOUS ONCE AS NEEDED
Status: DISCONTINUED | OUTPATIENT
Start: 2023-10-09 | End: 2023-10-11 | Stop reason: HOSPADM

## 2023-10-09 RX ORDER — KETOROLAC TROMETHAMINE 30 MG/ML
INJECTION, SOLUTION INTRAMUSCULAR; INTRAVENOUS AS NEEDED
Status: DISCONTINUED | OUTPATIENT
Start: 2023-10-09 | End: 2023-10-09

## 2023-10-09 RX ORDER — NALBUPHINE HYDROCHLORIDE 10 MG/ML
5 INJECTION, SOLUTION INTRAMUSCULAR; INTRAVENOUS; SUBCUTANEOUS
Status: DISCONTINUED | OUTPATIENT
Start: 2023-10-09 | End: 2023-10-10

## 2023-10-09 RX ORDER — HYDROMORPHONE HYDROCHLORIDE 1 MG/ML
0.2 INJECTION, SOLUTION INTRAMUSCULAR; INTRAVENOUS; SUBCUTANEOUS EVERY 5 MIN PRN
Status: DISCONTINUED | OUTPATIENT
Start: 2023-10-09 | End: 2023-10-11 | Stop reason: HOSPADM

## 2023-10-09 RX ORDER — ACETAMINOPHEN 120 MG/1
SUPPOSITORY RECTAL AS NEEDED
Status: DISCONTINUED | OUTPATIENT
Start: 2023-10-09 | End: 2023-10-09

## 2023-10-09 RX ORDER — PHENYLEPHRINE 10 MG/250 ML(40 MCG/ML)IN 0.9 % SOD.CHLORIDE INTRAVENOUS
CONTINUOUS PRN
Status: DISCONTINUED | OUTPATIENT
Start: 2023-10-09 | End: 2023-10-09

## 2023-10-09 RX ORDER — DIPHENHYDRAMINE HCL 25 MG
25 CAPSULE ORAL EVERY 4 HOURS PRN
Status: DISCONTINUED | OUTPATIENT
Start: 2023-10-09 | End: 2023-10-11 | Stop reason: HOSPADM

## 2023-10-09 RX ORDER — ENOXAPARIN SODIUM 100 MG/ML
40 INJECTION SUBCUTANEOUS 2 TIMES DAILY
Status: DISCONTINUED | OUTPATIENT
Start: 2023-10-10 | End: 2023-10-10

## 2023-10-09 RX ORDER — POLYETHYLENE GLYCOL 3350 17 G/17G
17 POWDER, FOR SOLUTION ORAL 2 TIMES DAILY PRN
Status: DISCONTINUED | OUTPATIENT
Start: 2023-10-09 | End: 2023-10-11 | Stop reason: HOSPADM

## 2023-10-09 RX ORDER — ACETAMINOPHEN 325 MG/1
975 TABLET ORAL EVERY 6 HOURS SCHEDULED
Status: DISCONTINUED | OUTPATIENT
Start: 2023-10-09 | End: 2023-10-11 | Stop reason: HOSPADM

## 2023-10-09 RX ORDER — OXYCODONE HYDROCHLORIDE 5 MG/1
10 TABLET ORAL EVERY 4 HOURS PRN
Status: DISCONTINUED | OUTPATIENT
Start: 2023-10-10 | End: 2023-10-11 | Stop reason: HOSPADM

## 2023-10-09 RX ORDER — METHYLERGONOVINE MALEATE 0.2 MG/ML
0.2 INJECTION INTRAVENOUS ONCE AS NEEDED
Status: DISCONTINUED | OUTPATIENT
Start: 2023-10-09 | End: 2023-10-11 | Stop reason: HOSPADM

## 2023-10-09 RX ORDER — ONDANSETRON HYDROCHLORIDE 2 MG/ML
4 INJECTION, SOLUTION INTRAVENOUS EVERY 6 HOURS PRN
Status: DISCONTINUED | OUTPATIENT
Start: 2023-10-09 | End: 2023-10-11 | Stop reason: HOSPADM

## 2023-10-09 RX ORDER — FENTANYL CITRATE 50 UG/ML
INJECTION, SOLUTION INTRAMUSCULAR; INTRAVENOUS AS NEEDED
Status: DISCONTINUED | OUTPATIENT
Start: 2023-10-09 | End: 2023-10-09

## 2023-10-09 RX ORDER — OXYTOCIN/0.9 % SODIUM CHLORIDE 30/500 ML
60 PLASTIC BAG, INJECTION (ML) INTRAVENOUS
Status: DISCONTINUED | OUTPATIENT
Start: 2023-10-09 | End: 2023-10-09

## 2023-10-09 RX ORDER — HYDROMORPHONE HYDROCHLORIDE 1 MG/ML
0.2 INJECTION, SOLUTION INTRAMUSCULAR; INTRAVENOUS; SUBCUTANEOUS EVERY 5 MIN PRN
Status: DISCONTINUED | OUTPATIENT
Start: 2023-10-09 | End: 2023-10-10

## 2023-10-09 RX ORDER — MIDAZOLAM HYDROCHLORIDE 1 MG/ML
INJECTION, SOLUTION INTRAMUSCULAR; INTRAVENOUS AS NEEDED
Status: DISCONTINUED | OUTPATIENT
Start: 2023-10-09 | End: 2023-10-09

## 2023-10-09 RX ORDER — NALOXONE HYDROCHLORIDE 0.4 MG/ML
0.1 INJECTION, SOLUTION INTRAMUSCULAR; INTRAVENOUS; SUBCUTANEOUS EVERY 5 MIN PRN
Status: DISCONTINUED | OUTPATIENT
Start: 2023-10-09 | End: 2023-10-11 | Stop reason: HOSPADM

## 2023-10-09 RX ORDER — FAMOTIDINE 10 MG/ML
INJECTION INTRAVENOUS AS NEEDED
Status: DISCONTINUED | OUTPATIENT
Start: 2023-10-09 | End: 2023-10-09

## 2023-10-09 RX ORDER — OXYTOCIN 10 [USP'U]/ML
10 INJECTION, SOLUTION INTRAMUSCULAR; INTRAVENOUS ONCE AS NEEDED
Status: DISCONTINUED | OUTPATIENT
Start: 2023-10-09 | End: 2023-10-11 | Stop reason: HOSPADM

## 2023-10-09 RX ORDER — ADHESIVE BANDAGE
10 BANDAGE TOPICAL
Status: DISCONTINUED | OUTPATIENT
Start: 2023-10-09 | End: 2023-10-11 | Stop reason: HOSPADM

## 2023-10-09 RX ORDER — LABETALOL HYDROCHLORIDE 5 MG/ML
20 INJECTION, SOLUTION INTRAVENOUS ONCE AS NEEDED
Status: DISCONTINUED | OUTPATIENT
Start: 2023-10-09 | End: 2023-10-11 | Stop reason: HOSPADM

## 2023-10-09 RX ORDER — KETOROLAC TROMETHAMINE 30 MG/ML
30 INJECTION, SOLUTION INTRAMUSCULAR; INTRAVENOUS EVERY 6 HOURS
Status: COMPLETED | OUTPATIENT
Start: 2023-10-09 | End: 2023-10-10

## 2023-10-09 RX ORDER — CARBOPROST TROMETHAMINE 250 UG/ML
250 INJECTION, SOLUTION INTRAMUSCULAR ONCE AS NEEDED
Status: DISCONTINUED | OUTPATIENT
Start: 2023-10-09 | End: 2023-10-11 | Stop reason: HOSPADM

## 2023-10-09 RX ORDER — ONDANSETRON 4 MG/1
4 TABLET, FILM COATED ORAL EVERY 6 HOURS PRN
Status: DISCONTINUED | OUTPATIENT
Start: 2023-10-09 | End: 2023-10-11 | Stop reason: HOSPADM

## 2023-10-09 RX ORDER — CEFAZOLIN 1 G/1
INJECTION, POWDER, FOR SOLUTION INTRAVENOUS AS NEEDED
Status: DISCONTINUED | OUTPATIENT
Start: 2023-10-09 | End: 2023-10-09

## 2023-10-09 RX ORDER — LOPERAMIDE HYDROCHLORIDE 2 MG/1
4 CAPSULE ORAL EVERY 2 HOUR PRN
Status: DISCONTINUED | OUTPATIENT
Start: 2023-10-09 | End: 2023-10-11 | Stop reason: HOSPADM

## 2023-10-09 RX ORDER — DIPHENHYDRAMINE HYDROCHLORIDE 50 MG/ML
25 INJECTION INTRAMUSCULAR; INTRAVENOUS EVERY 4 HOURS PRN
Status: DISCONTINUED | OUTPATIENT
Start: 2023-10-09 | End: 2023-10-11 | Stop reason: HOSPADM

## 2023-10-09 RX ORDER — HYDRALAZINE HYDROCHLORIDE 20 MG/ML
5 INJECTION INTRAMUSCULAR; INTRAVENOUS ONCE AS NEEDED
Status: DISCONTINUED | OUTPATIENT
Start: 2023-10-09 | End: 2023-10-11 | Stop reason: HOSPADM

## 2023-10-09 RX ORDER — PROPOFOL 10 MG/ML
INJECTION, EMULSION INTRAVENOUS CONTINUOUS PRN
Status: DISCONTINUED | OUTPATIENT
Start: 2023-10-09 | End: 2023-10-09

## 2023-10-09 RX ORDER — MISOPROSTOL 200 UG/1
800 TABLET ORAL ONCE AS NEEDED
Status: DISCONTINUED | OUTPATIENT
Start: 2023-10-09 | End: 2023-10-11 | Stop reason: HOSPADM

## 2023-10-09 RX ORDER — NIFEDIPINE 10 MG/1
10 CAPSULE ORAL ONCE AS NEEDED
Status: DISCONTINUED | OUTPATIENT
Start: 2023-10-09 | End: 2023-10-11 | Stop reason: HOSPADM

## 2023-10-09 RX ORDER — BISACODYL 10 MG/1
10 SUPPOSITORY RECTAL DAILY PRN
Status: DISCONTINUED | OUTPATIENT
Start: 2023-10-09 | End: 2023-10-11 | Stop reason: HOSPADM

## 2023-10-09 RX ORDER — OXYCODONE HYDROCHLORIDE 5 MG/1
5 TABLET ORAL EVERY 4 HOURS PRN
Status: DISCONTINUED | OUTPATIENT
Start: 2023-10-10 | End: 2023-10-11 | Stop reason: HOSPADM

## 2023-10-09 RX ORDER — PHENYLEPHRINE HCL IN 0.9% NACL 0.4MG/10ML
SYRINGE (ML) INTRAVENOUS AS NEEDED
Status: DISCONTINUED | OUTPATIENT
Start: 2023-10-09 | End: 2023-10-09

## 2023-10-09 RX ORDER — SIMETHICONE 80 MG
80 TABLET,CHEWABLE ORAL 4 TIMES DAILY PRN
Status: DISCONTINUED | OUTPATIENT
Start: 2023-10-09 | End: 2023-10-11 | Stop reason: HOSPADM

## 2023-10-09 RX ORDER — IBUPROFEN 600 MG/1
600 TABLET ORAL EVERY 6 HOURS
Status: DISCONTINUED | OUTPATIENT
Start: 2023-10-10 | End: 2023-10-11 | Stop reason: HOSPADM

## 2023-10-09 RX ORDER — MISOPROSTOL 200 UG/1
400 TABLET ORAL 4 TIMES DAILY
Status: DISCONTINUED | OUTPATIENT
Start: 2023-10-09 | End: 2023-10-09

## 2023-10-09 RX ADMIN — DEXMEDETOMIDINE HYDROCHLORIDE 4 MCG: 100 INJECTION, SOLUTION INTRAVENOUS at 12:20

## 2023-10-09 RX ADMIN — LIDOCAINE HYDROCHLORIDE,EPINEPHRINE BITARTRATE 3 ML: 20; .005 INJECTION, SOLUTION EPIDURAL; INFILTRATION; INTRACAUDAL; PERINEURAL at 11:34

## 2023-10-09 RX ADMIN — CEFAZOLIN 2 G: 330 INJECTION, POWDER, FOR SOLUTION INTRAMUSCULAR; INTRAVENOUS at 12:35

## 2023-10-09 RX ADMIN — ONDANSETRON 4 MG: 2 INJECTION INTRAMUSCULAR; INTRAVENOUS at 19:02

## 2023-10-09 RX ADMIN — PROPOFOL 100 MCG/KG/MIN: 10 INJECTION, EMULSION INTRAVENOUS at 12:49

## 2023-10-09 RX ADMIN — DEXMEDETOMIDINE HYDROCHLORIDE 4 MCG: 100 INJECTION, SOLUTION INTRAVENOUS at 12:49

## 2023-10-09 RX ADMIN — ACETAMINOPHEN 650 MG: 120 SUPPOSITORY RECTAL at 13:29

## 2023-10-09 RX ADMIN — CARBOPROST TROMETHAMINE 250 MCG: 250 INJECTION, SOLUTION INTRAMUSCULAR at 12:20

## 2023-10-09 RX ADMIN — Medication 80 MCG: at 12:24

## 2023-10-09 RX ADMIN — MISOPROSTOL 400 MCG: 200 TABLET ORAL at 13:37

## 2023-10-09 RX ADMIN — CEFAZOLIN 2 G: 330 INJECTION, POWDER, FOR SOLUTION INTRAMUSCULAR; INTRAVENOUS at 12:03

## 2023-10-09 RX ADMIN — SODIUM CHLORIDE: 9 INJECTION, SOLUTION INTRAVENOUS at 14:21

## 2023-10-09 RX ADMIN — Medication 28 MILLI-UNITS/MIN: at 06:32

## 2023-10-09 RX ADMIN — SODIUM CHLORIDE, SODIUM LACTATE, POTASSIUM CHLORIDE, AND CALCIUM CHLORIDE: 600; 310; 30; 20 INJECTION, SOLUTION INTRAVENOUS at 12:40

## 2023-10-09 RX ADMIN — MORPHINE SULFATE 3 MG: 0.5 INJECTION, SOLUTION EPIDURAL; INTRATHECAL; INTRAVENOUS at 12:52

## 2023-10-09 RX ADMIN — ALBUMIN (HUMAN) 250 ML: 12.5 SOLUTION INTRAVENOUS at 12:20

## 2023-10-09 RX ADMIN — KETOROLAC TROMETHAMINE 30 MG: 30 INJECTION, SOLUTION INTRAMUSCULAR at 13:15

## 2023-10-09 RX ADMIN — DEXMEDETOMIDINE HYDROCHLORIDE 4 MCG: 100 INJECTION, SOLUTION INTRAVENOUS at 12:45

## 2023-10-09 RX ADMIN — Medication 0.49 MCG/KG/MIN: at 12:25

## 2023-10-09 RX ADMIN — KETOROLAC TROMETHAMINE 30 MG: 30 INJECTION, SOLUTION INTRAMUSCULAR; INTRAVENOUS at 19:02

## 2023-10-09 RX ADMIN — AZITHROMYCIN MONOHYDRATE 500 MG: 500 INJECTION, POWDER, LYOPHILIZED, FOR SOLUTION INTRAVENOUS at 12:03

## 2023-10-09 RX ADMIN — ONDANSETRON 4 MG: 2 INJECTION INTRAMUSCULAR; INTRAVENOUS at 11:15

## 2023-10-09 RX ADMIN — DEXMEDETOMIDINE HYDROCHLORIDE 4 MCG: 100 INJECTION, SOLUTION INTRAVENOUS at 12:25

## 2023-10-09 RX ADMIN — SODIUM CHLORIDE, SODIUM LACTATE, POTASSIUM CHLORIDE, AND CALCIUM CHLORIDE: 600; 310; 30; 20 INJECTION, SOLUTION INTRAVENOUS at 11:38

## 2023-10-09 RX ADMIN — SODIUM CHLORIDE, SODIUM LACTATE, POTASSIUM CHLORIDE, AND CALCIUM CHLORIDE: 600; 310; 30; 20 INJECTION, SOLUTION INTRAVENOUS at 11:56

## 2023-10-09 RX ADMIN — PENICILLIN G 3 MILLION UNITS: 3000000 INJECTION, SOLUTION INTRAVENOUS at 03:34

## 2023-10-09 RX ADMIN — LIDOCAINE HYDROCHLORIDE,EPINEPHRINE BITARTRATE 4 ML: 20; .005 INJECTION, SOLUTION EPIDURAL; INFILTRATION; INTRACAUDAL; PERINEURAL at 12:44

## 2023-10-09 RX ADMIN — CARBOPROST TROMETHAMINE 250 MCG: 250 INJECTION, SOLUTION INTRAMUSCULAR at 12:40

## 2023-10-09 RX ADMIN — DEXAMETHASONE SODIUM PHOSPHATE 4 MG: 4 INJECTION, SOLUTION INTRAMUSCULAR; INTRAVENOUS at 11:16

## 2023-10-09 RX ADMIN — ALBUMIN (HUMAN) 250 ML: 12.5 SOLUTION INTRAVENOUS at 12:44

## 2023-10-09 RX ADMIN — DEXTROSE: 50 INJECTION, SOLUTION INTRAVENOUS at 11:47

## 2023-10-09 RX ADMIN — SODIUM CHLORIDE, SODIUM LACTATE, POTASSIUM CHLORIDE, AND CALCIUM CHLORIDE: 600; 310; 30; 20 INJECTION, SOLUTION INTRAVENOUS at 11:47

## 2023-10-09 RX ADMIN — MORPHINE SULFATE 2 MG: 0.5 INJECTION EPIDURAL; INTRATHECAL; INTRAVENOUS at 12:43

## 2023-10-09 RX ADMIN — TRANEXAMIC ACID 1000 MG: 100 INJECTION, SOLUTION INTRAVENOUS at 12:20

## 2023-10-09 RX ADMIN — SODIUM CHLORIDE, SODIUM LACTATE, POTASSIUM CHLORIDE, CALCIUM CHLORIDE AND DEXTROSE MONOHYDRATE 125 ML/HR: 5; 600; 310; 30; 20 INJECTION, SOLUTION INTRAVENOUS at 21:47

## 2023-10-09 RX ADMIN — Medication 120 MCG: at 12:26

## 2023-10-09 RX ADMIN — FAMOTIDINE 20 MG: 10 INJECTION INTRAVENOUS at 11:17

## 2023-10-09 RX ADMIN — FENTANYL CITRATE 100 MCG: 50 INJECTION, SOLUTION INTRAMUSCULAR; INTRAVENOUS at 11:34

## 2023-10-09 RX ADMIN — MIDAZOLAM 2 MG: 1 INJECTION INTRAMUSCULAR; INTRAVENOUS at 12:29

## 2023-10-09 RX ADMIN — LIDOCAINE HYDROCHLORIDE,EPINEPHRINE BITARTRATE 3 ML: 20; .005 INJECTION, SOLUTION EPIDURAL; INFILTRATION; INTRACAUDAL; PERINEURAL at 11:42

## 2023-10-09 RX ADMIN — PENICILLIN G 3 MILLION UNITS: 3000000 INJECTION, SOLUTION INTRAVENOUS at 05:47

## 2023-10-09 RX ADMIN — LIDOCAINE HYDROCHLORIDE,EPINEPHRINE BITARTRATE 5 ML: 20; .005 INJECTION, SOLUTION EPIDURAL; INFILTRATION; INTRACAUDAL; PERINEURAL at 11:39

## 2023-10-09 SDOH — HEALTH STABILITY: MENTAL HEALTH: WERE YOU ABLE TO COMPLETE ALL THE BEHAVIORAL HEALTH SCREENINGS?: YES

## 2023-10-09 SDOH — HEALTH STABILITY: MENTAL HEALTH: HAVE YOU USED ANY SUBSTANCES (CANABIS, COCAINE, HEROIN, HALLUCINOGENS, INHALANTS, ETC.) IN THE PAST 12 MONTHS?: NO

## 2023-10-09 SDOH — SOCIAL STABILITY: SOCIAL INSECURITY: DOES ANYONE TRY TO KEEP YOU FROM HAVING/CONTACTING OTHER FRIENDS OR DOING THINGS OUTSIDE YOUR HOME?: NO

## 2023-10-09 SDOH — HEALTH STABILITY: MENTAL HEALTH: STRENGTHS (MUST CHOOSE TWO): SUPPORT FROM FAMILY;DEMONSTRATES EFFECTIVE COPING SKILLS

## 2023-10-09 SDOH — ECONOMIC STABILITY: HOUSING INSECURITY: DO YOU FEEL UNSAFE GOING BACK TO THE PLACE WHERE YOU ARE LIVING?: NO

## 2023-10-09 SDOH — SOCIAL STABILITY: SOCIAL INSECURITY: ARE YOU OR HAVE YOU BEEN THREATENED OR ABUSED PHYSICALLY, EMOTIONALLY, OR SEXUALLY BY ANYONE?: NO

## 2023-10-09 SDOH — SOCIAL STABILITY: SOCIAL INSECURITY: DO YOU FEEL ANYONE HAS EXPLOITED OR TAKEN ADVANTAGE OF YOU FINANCIALLY OR OF YOUR PERSONAL PROPERTY?: NO

## 2023-10-09 SDOH — SOCIAL STABILITY: SOCIAL INSECURITY: ABUSE SCREEN: ADULT

## 2023-10-09 SDOH — SOCIAL STABILITY: SOCIAL INSECURITY: VERBAL ABUSE: DENIES

## 2023-10-09 SDOH — SOCIAL STABILITY: SOCIAL INSECURITY: HAS ANYONE EVER THREATENED TO HURT YOUR FAMILY OR YOUR PETS?: NO

## 2023-10-09 SDOH — HEALTH STABILITY: MENTAL HEALTH: HAVE YOU USED ANY PRESCRIPTION DRUGS OTHER THAN PRESCRIBED IN THE PAST 12 MONTHS?: NO

## 2023-10-09 SDOH — SOCIAL STABILITY: SOCIAL INSECURITY: HAVE YOU HAD THOUGHTS OF HARMING ANYONE ELSE?: NO

## 2023-10-09 SDOH — SOCIAL STABILITY: SOCIAL INSECURITY: PHYSICAL ABUSE: DENIES

## 2023-10-09 SDOH — SOCIAL STABILITY: SOCIAL INSECURITY: ARE THERE ANY APPARENT SIGNS OF INJURIES/BEHAVIORS THAT COULD BE RELATED TO ABUSE/NEGLECT?: NO

## 2023-10-09 ASSESSMENT — PAIN SCALES - GENERAL
PAINLEVEL_OUTOF10: 3
PAINLEVEL_OUTOF10: 0 - NO PAIN
PAINLEVEL_OUTOF10: 5 - MODERATE PAIN
PAINLEVEL_OUTOF10: 0 - NO PAIN
PAINLEVEL_OUTOF10: 0 - NO PAIN
PAINLEVEL_OUTOF10: 3
PAINLEVEL_OUTOF10: 0 - NO PAIN
PAINLEVEL_OUTOF10: 3
PAINLEVEL_OUTOF10: 0 - NO PAIN
PAINLEVEL_OUTOF10: 0 - NO PAIN

## 2023-10-09 ASSESSMENT — PATIENT HEALTH QUESTIONNAIRE - PHQ9
2. FEELING DOWN, DEPRESSED OR HOPELESS: NOT AT ALL
1. LITTLE INTEREST OR PLEASURE IN DOING THINGS: NOT AT ALL
SUM OF ALL RESPONSES TO PHQ9 QUESTIONS 1 & 2: 0

## 2023-10-09 ASSESSMENT — LIFESTYLE VARIABLES
HOW OFTEN DO YOU HAVE 6 OR MORE DRINKS ON ONE OCCASION: NEVER
AUDIT-C TOTAL SCORE: 0
HOW MANY STANDARD DRINKS CONTAINING ALCOHOL DO YOU HAVE ON A TYPICAL DAY: PATIENT DOES NOT DRINK
HOW OFTEN DO YOU HAVE A DRINK CONTAINING ALCOHOL: NEVER
SKIP TO QUESTIONS 9-10: 1
AUDIT-C TOTAL SCORE: 0

## 2023-10-09 NOTE — INDIVIDUALIZED OVERALL PLAN OF CARE NOTE
Pt denies lightheadedness/dizziness. Bleeding stable. RUBÉN tubing with ~50 mL blood stable since transfer from OR.     Visit Vitals  /67   Pulse 100   Temp 36.5 °C (97.7 °F) (Temporal)   Resp 18     General: pale    A/P  27 yo G1 now P1 s/p pLTCS @ 37.2 wga for arrest of descent (IOL for T1DM)  c/b PPH (EBL 3 L) 2/2 marked uterine atony s/p Pit bolus, Hemabate x 2, TXA, buccal cytotec, IntraUterine Pitocin, O'Golconda stitch, B-Rollins compression suture, and RUBÉN placement. Received 1 unit pRBCs and 1 unit FFP intraoperatively.    Postoperative care  - Pain control per ERAS  - Self-limited regular diet  - [place] out POD#1. Strict IO's  - Hold DVT ppx for increased bleeding risk    PPH  - Postop Hgb 9.0 (from 11.4). Follow-up POD#1 Hgb .  - Coags/Fibrinogen wnl  - RUBÉN with minimal output. Will turn off suction and deflate balloon. For removal pending clinical stability.  - Continue to monitor closely    T1DM  - Transitioned to PP Insulin pump recs    Dispo: continue monitoring on L&D    Discussed with Dr. Fatimah Jones MD  Labor and Delivery

## 2023-10-09 NOTE — PROGRESS NOTES
Antepartum Progress Note    Assessment/Plan   Becca Irene is a 28 y.o.  at 37w1d. TIM: 10/28/2023, by Ultrasound admitted for IOL    Antepartum Progress Note    Assessment/Plan   Becca Irene is a 28 y.o.  at 37w1d. TIM: 10/28/2023, by Ultrasound. Undergoing IOL in the setting of gHTN and T1DM.     IOL  - s/p AROM, continue pitocin per protocol, currently at 22  - GBS +, continue PCN  - Hgb 11.4 on admission ,T&C x1U for risk factors  - epidural infusing  - Discussed with patient that if she is not in active labor by 18 hours of pitocin and AROM, would recommend primary CS for failed induction  - Continue position changes for caput  - Next cervical exam around 0100    gHTN  - dx by mild ranges greater than 4 hours apart  - HELLP Labs neg, P:C 0.18  - Asymptomatic     T1DM  - Last Growth  EFW 3078g 93%, AC >99%, extrapolates to 3400g  - q1 hour BG checks  - Sliding scale per pump  - Fetal echo wnl   - Current pump tandem/dexcom suing control IQ:   Active labor/or decreased po intake (ie oxytocin initiation):  -IVF to include dextrose  -Turn control IQ off.  -Temporary basal on pump (basal rate at time of transition) with hourly adjustments PRN based on below:  BG <70        --> decrease pump basal by 20%, treat hypoglycemia (oral intake or D10)  BG    --> maintain current pump basal rate  -150 --> increase pump basal rate by 20%          --> contact MFM, may need insulin infusion     Postpartum:   - Resume control IQ  - New settings:  Basal                     Carb Ratio         CF     [id number] 0.80          1:11            1:35  2045-4446 0.85          1:11            1:35   8912-1727 0.65          1:7              1:35  3003-8303 0.60          1:6              1:35  7449-5534 0.60          1:7              1:35     Target 130  Insulin DOA 5 hrs  Max bolus 25     Fetal status  - FHT Cat I   - CEFM     PPBC  - discussed options for postpartum birth control  - Patient desires  Alex    D/w Dr. Jose Lee MD      Principal Problem:    37 weeks gestation of pregnancy  Active Problems:    Gastroesophageal reflux disease    Pregnancy Problems (from 03/28/23 to present)       Problem Noted Resolved    37 weeks gestation of pregnancy 10/7/2023 by Gila Garcia MD No    Priority:  Medium      Gestational hypertension, third trimester 10/6/2023 by Ramirez Rincon MD No    Priority:  Medium      36 weeks gestation of pregnancy 10/5/2023 by Ramirez Rincon MD No    Priority:  Medium      Depression affecting pregnancy in third trimester, antepartum 10/5/2023 by Ramirez Rincon MD No    Priority:  Medium      Type 1 diabetes mellitus affecting pregnancy in third trimester, antepartum 10/2/2023 by Ramirez Rincon MD No    Priority:  Medium      Overview Signed 10/6/2023  7:29 PM by Ramirez Rincon MD                        Subjective   Patient comfortable with epidural    Objective   Allergies:   Patient has no known allergies.    Last Vitals:  Temp Pulse Resp BP MAP Pulse Ox   36.3 °C (97.3 °F) 82 17 124/68   100 %     Vitals Min/Max Last 24 Hours:  Temp  Min: 36.1 °C (97 °F)  Max: 37.4 °C (99.3 °F)  Pulse  Min: 64  Max: 112  Resp  Min: 16  Max: 18  BP  Min: 107/58  Max: 133/73    Physical Exam:  General: Well appearing, alert  HEENT: normocephalic, EOMI, clear sclera  Cardio: Warm and well perfused  Resp: breathing comfortably on room air  Abd: gravid  Neuro: grossly intact, no focal deficits  Extremities: full ROM, no calf tenderness  Psych: A&O x3, appropriate mood and affect    SVE: 3/80/-2, fetus with caput, not well applied  FHT: 145/mod/+accel/-decel  TOCO: q2min    Lab Data:  Lab Results   Component Value Date    WBC 10.4 10/07/2023    HGB 11.4 (L) 10/07/2023    HCT 33.8 (L) 10/07/2023     10/07/2023           Principal Problem:    37 weeks gestation of pregnancy  Active Problems:    Gastroesophageal reflux disease

## 2023-10-09 NOTE — INDIVIDUALIZED OVERALL PLAN OF CARE NOTE
Decision for caesarian section   Patient complete and pushing for >2hours, initially with good descent but has made minimal descent for the last hour  OP by exam and BSUS. Discussed labor course with patient, discussed management options including continuing to push, attempting manual rotation, or proceeding with . Patient states desire to proceed with pLTCS, discussed again risks/benefits/alternatives and she again expressed desire to proceed with non-scheduled non-urgent c/s     Doris Farr MD

## 2023-10-09 NOTE — INDIVIDUALIZED OVERALL PLAN OF CARE NOTE
Complete and pushing     0900 10/100/0  FHT:  135/mod cynthia/+ acc/- decels, Cat I   [place]: q3 min contractions   Pushing well and making descent,   BG recently 87, on insulin pump   EFW 93%, AC 99% previously counseled on risk of shoulder dystocia     Doris Farr MD

## 2023-10-09 NOTE — INDIVIDUALIZED OVERALL PLAN OF CARE NOTE
"Labor Progress Note    Subjective: Patient starting to feel increased pressure    Objective:  Vitals: /78   Pulse 86   Temp 37.5 °C (99.5 °F) (Temporal)   Resp 16   Ht 1.676 m (5' 6\")   Wt 101 kg (222 lb 10.6 oz)   LMP 01/22/2023   SpO2 100%   BMI 35.94 kg/m²   Cervix: 4.5/90/-1  FHT:  150/mod/+accel/-decel  [place]: q1-2min    IOL  - Discussed with patient that most patients will make it into active labor by this time (18 hours of pitocin +AROM). This is a large cervical change from her last exam, however, and it's possible that she is close to transitioning to the active part of labor. Discussed the risks of continuing IOL including hemorrhage and IAI without known assurance that it would result in a vaginal delivery. Also discussed risks of CS including bleeding, infection, damage to intraabdominal structures.   - Reviewed that FHT is reassuring and there is no emergency at this time. Reasonable to repeat cervical exam in 1-2 hours if desiring continuing IOL. Also reasonable to proceed with CS for failed IOL at this time.   - Patient desires to speak with her  and consider the options. Instructed her to let us know when she decides.   - Continue pitocin per protocol until decision for CS made  - CEFM, currently Category I  - Epidural infusing    Hermelinda Lee MD  OB/GYN    "

## 2023-10-09 NOTE — INDIVIDUALIZED OVERALL PLAN OF CARE NOTE
Labor course update;    Checked at 0440, 0  Recheck at 0640 , cervix anterior  Tracing remains Cat I  /69, Temp 36.2,     Active labor, progressing appropriately  Pitocin at 28units  T1DM: BG most recently 92, on insulin pump, EFW 93% AC 99%  Admission Hgb 11.4  Anticipate , counseled on risk of shoulder dystocia    Shanelle Figueroa MD

## 2023-10-09 NOTE — ANESTHESIA POSTPROCEDURE EVALUATION
Patient: Becca Irene    Procedure Summary       Date: 10/07/23 Room / Location: [facility] /  [facility]    Anesthesia Start:  Anesthesia Stop: 10/09/23 1339    Procedure:  Section Diagnosis:       37 weeks gestation of pregnancy      (37 weeks gestation of pregnancy [Z3A.37])    Surgeons: Dorothy Sheridan MD Responsible Provider: Bran Henderson MD    Anesthesia Type: epidural ASA Status: 2            Anesthesia Type: epidural    Vitals Value Taken Time   /61 10/09/23 1442   Temp 36.5 °C (97.7 °F) 10/09/23 1428   Pulse 100 10/09/23 1447   Resp 16 10/09/23 1428   SpO2 99 % 10/09/23 1447       Anesthesia Post Evaluation    There were no known notable events for this encounter.

## 2023-10-09 NOTE — OP NOTE
Date: 10/9/2023  OR Location: [facility]    Name: Becca Irene, : 1995, Age: 28 y.o., MRN: [id number], Sex: female    Diagnosis  Pre-op Diagnosis     * 37 weeks gestation of pregnancy [Z3A.37] Post-op Diagnosis     * 37 weeks gestation of pregnancy [Z3A.37]     Procedures   Section  [address] - [place]  DELIVERY ONLY  O'Camak suture  B-Rollins suture  RUBÉN placement    Surgeons      * Dorothy Sheridan - Primary    Resident/Fellow/Other Assistant:  Yasmani Jones MD, MS (PGY-4)  Doris Farr MD (PGY-3)    Procedure Summary  Anesthesia: * No anesthesia type entered *  ASA: II  Anesthesia Staff: Anesthesiologist: Bran Henderson MD; Lolita King MD; Chelsie Mock MD  CRNA: MARLEN Mauricio-CRNA  C-AA: JENNIFER Mireles  Anesthesia Resident: Alize Mg MD; Suzette Wang MD    Estimated Blood Loss: 3000 mL    Intra-op Medications:   Medication Name Total Dose   dextrose 5 % and lactated Ringer's infusion 3,968.75 mL   fentaNYL (PF)-BUPivacaine - Epi 1.25 mcg/mL- 0.044 % epidural infusion 496.3 mL   oxytocin (Pitocin) bolus from bag 21,660 thelma-units   oxytocin (Pitocin) infusion in sodium chloride 0.9% 30 units/500 mL 42,466 thelma-units   carboprost (Hemabate) injection 250 mcg 500 mcg   lactated Ringer's bolus 500 mL 445 mL   miSOPROStoL (Cytotec) tablet 800 mcg 400 mcg   tranexamic acid (Cyklokapron) injection 1,000 mg 1,000 mg        Anesthesia Record             Intraprocedure I/O Totals          Intake    Dexmedetomidine 0.00 mL    The total shown is the total volume documented since Anesthesia Start was filed.    D5 50.00 mL    Propofol Drip 0.00 mL    The total shown is the total volume documented since Anesthesia Start was filed.    Oxytocin Drip 39.27 mL    The total shown is the total volume documented since Anesthesia Start was filed.    Phenylephrine Drip 0.00 mL    The total shown is the total volume documented since Anesthesia Start was filed.    fentaNYL  (PF)-BUPivacaine - Epi 1.25 mcg/mL- 0.044 % epidural infusion 500.00 mL    penicillin G potassium IVPB 3 Million Units 50.00 mL    Total Intake 639.27 mL       Output    Urine 270 mL    Total Blood Loss - Surgical Delivery (mL) 3000 mL    Total Output 3270 mL       Net    Net Volume -2630.73 mL          Specimen: No specimens collected     Staff:   Scrub Person: Florina Nichols    Informed Consent:  The risks, benefits, complications, and alternatives were discussed with the patient. The patient understood that the risks of  section include, but are not limited to: injury to nearby structures or organs, infection, blood loss and possible need for transfusion, and potential need for more surgery including hysterectomy. The patient stated understanding and desired to proceed. All questions were answered. The site of surgery was properly noted and marked. The patient was identified as Becca Irene and the procedure verified as a  delivery. A Time Out was held and the above information confirmed.    Findings:  Normal appearing uterus, bilateral fallopian tubes and ovaries. PPH 2/2 marked uterine atony s/p Pit bolus, IM hemabate x 2 doses, IV TXA, intrauterine Pitocin, buccal Cytotec, O'Jefferson suture, B-orourke suture, and RUBÉN placement.    Fetus deeply impacted in occiput posterior presentation requiring hand from below for delivery.    Procedure Details:  The patient was taken to the operating room where Epidural  anesthesia was found to be adequate. Antibiotics were given for infection prophylaxis. She was prepped and draped in the normal sterile fashion in the dorsal lithotomy position with leftward tilt. A Pfannenstiel skin incision was made with scalpel and carried down to the level of the fascia. The fascia was incised sharply and extended laterally bluntly. The underlying rectus and pyramidalis muscles were dissected off bluntly. In a similar fashion, the rectus muscle was dissected off  bluntly. The rectus muscles were  bluntly. The peritoneum was identified and entered with hemostats sharply. Peritoneal incision was extended superiorly and inferiorly with good visualization of the bladder.    The bladder blade was inserted. The lower uterine segment was then incised with a low transverse  incision and was extended bluntly by cephalocaudal stretch. The fetal vertex was noted to be occiput posterior and deeply impacted. A hand from below was required to destation the fetal vertex allowing for a hand from above to be placed around the fetal occiput and deliver the fetus. The cord was clamped and cut after delay and the baby was handed off to the awaiting clinicians.     The placenta was removed via manual massage. The uterus was then exteriorized and cleared of all clots and debris. The hysterotomy was examined and a 3 cm extension through the broad ligament on the left was noted with brisk bleeding as well as a deep extension through the lower uterine segment. The uterus was noted to be markedly atonic. TXA and IV Hemabate were administered and attention was turned to repair of the extension. The extension was repaired with a running locked suture of 0 Vicryl. The hysterotomy was then closed using two running locked sutures of 0-Vicryl beginning at either side of the hysterotomy and meeting in the middle. The uterus remained markedly atonic. IntraUterine Pitocin was administered. The hysterotomy was examined and brisk bleeding continued to be noted from the extension on the left. The uterine vessels were identified and an O'Labolt compression suture was placed with marked improvement in bleeding. The serosa was oversewn at the level of the extension and hemostasis was noted. Marked atony continued. Buccal cytotec and an additional dose of Hemabate were administered. The hysterotomy was imbricated with a running suture of 0-Vicryl. The uterus remained atonic and a B-orourke compression suture  of 1-Chromic was placed. The hysterotomy was examined and found to be hemostatic; however. The uterus was placed back inside the abdomen. The hysterotomy was examined and found to be hemostatic. The uterus remained atonic, though improved. A RUBÉN was placed in a sterile fashion. The uterine incision was reinspected and found to be hemostatic. Floseal was placed over the lateral extension. The subfascial spaces were inspected and noted to be hemostatic. The fascia was then reapproximated with a running suture of 0-PDS. The subcutaneous layer was reapproximated with 2-0 Monocryl. The skin was closed with 4-0 Monocryl sutures.  Patient received 1 unit of PRBC and 1 units of FFP during the case.    The patient tolerated the procedure well. Sponge, instrument, and needle counts were correct and the patient was taken to the recovery room in good and stable condition.    Disposition: Labor Room  Condition: stable

## 2023-10-09 NOTE — INDIVIDUALIZED OVERALL PLAN OF CARE NOTE
"Labor Progress Note    Subjective: Still feeling pressure    Objective:  Vitals: /73   Pulse 80   Temp 37 °C (98.6 °F) (Temporal)   Resp 16   Ht 1.676 m (5' 6\")   Wt 101 kg (222 lb 10.6 oz)   LMP 01/22/2023   SpO2 99%   BMI 35.94 kg/m²   Cervix: 7/100/-1  FHT:  145/mod/+accel/-decel  [place]: q2min    Active labor  Continue pitocin per protocol  CEFM, currently Category I  Epidural infusing  Will recheck in two hours or sooner as indicated    T1DM  Continue insulin pump  Euglycemic    Hermelinda Lee MD  OB/GYN    "

## 2023-10-09 NOTE — L&D DELIVERY NOTE
OB Delivery Note  10/9/2023  Becca CALVO Maria Victoria  28 y.o.     Gestational Age: 37w2d  /Para:   Estimated Blood Loss:   Delivery Blood Loss  10/09/23 1211 - 10/09/23 1514      Total Blood Loss - Surgical Delivery (mL) Anesthesia 3000 mL    Total  3000 mL          Quantitative Blood Loss:      Guicho Irene [[id number]]      Labor Events    Rupture date/time: 10/8/2023 0137  Rupture type: Artificial  Fluid color: Clear  Fluid odor: None  Labor type: Induced Onset of Labor  Labor allowed to proceed with plans for an attempted vaginal birth?: Yes  Induction: Misoprostol, Oxytocin, AROM  Induction indications: Hypertension  Complications: Failure to Progress in Second Stage, Hemorrhage       Labor Event Times    Dilation complete date/time: 10/9/2023 0853  Start pushing date/time: 10/9/2023 09       Labor Length    2nd stage: 3h 23m  3rd stage: 0h 01m       Placenta    Placenta delivery date/time: 10/9/2023 1217  Placenta removal: Manual removal  Placenta appearance: Intact  Placenta disposition: pathology       Cord    Vessels: 3 vessels  Delayed cord clamping?: Yes  Cord blood disposition: Lab, Discarded  Gases sent?: Yes  Stem cell collection (by provider): No       Anesthesia    Method: Epidural       Operative Delivery    Forceps attempted?: No  Vacuum extractor attempted?: No        Delivery    Birth date/time: 10/9/2022 12:16:00  Delivery type: , Low Transverse   categorization: primary   priority: routine  Indications for : Arrest of Active Phase  Incision type: low transverse  Complications: Failure to Progress in Second Stage, Hemorrhage       Resuscitation    Method: Suctioning, Tactile stimulation       Apgars    Living status: Living  Apgar Component Scores:  1 min.:  5 min.:  10 min.:  15 min.:  20 min.:    Skin color:  0  1       Heart rate:  2  2       Reflex irritability:  2  2       Muscle tone:  1  2       Respiratory effort:  2  2       Total:   7  9       Apgars assigned by: YAMILET BRAGG       Delivery Providers    Delivering clinician: Dorothy Sheridan MD   Provider Role    Yola Peres RN Delivery Nurse    Barb Martin, RN Nursery Nurse    Doris Farr MD Resident                 Yasmani Jones MD

## 2023-10-10 ENCOUNTER — ANESTHESIA EVENT (OUTPATIENT)
Dept: OBSTETRICS AND GYNECOLOGY | Facility: HOSPITAL | Age: 28
End: 2023-10-10
Payer: MEDICAID

## 2023-10-10 ENCOUNTER — ANESTHESIA (OUTPATIENT)
Dept: OBSTETRICS AND GYNECOLOGY | Facility: HOSPITAL | Age: 28
End: 2023-10-10
Payer: MEDICAID

## 2023-10-10 LAB
ALBUMIN SERPL BCP-MCNC: 2.8 G/DL (ref 3.4–5)
ALP SERPL-CCNC: 137 U/L (ref 33–110)
ALT SERPL W P-5'-P-CCNC: 7 U/L (ref 7–45)
ANION GAP BLDV CALCULATED.4IONS-SCNC: 11 MMOL/L (ref 10–25)
ANION GAP SERPL CALC-SCNC: 15 MMOL/L (ref 10–20)
AST SERPL W P-5'-P-CCNC: 24 U/L (ref 9–39)
B-OH-BUTYR SERPL-SCNC: 0.16 MMOL/L (ref 0.02–0.27)
B-OH-BUTYR SERPL-SCNC: 0.16 MMOL/L (ref 0.02–0.27)
BASE EXCESS BLDV CALC-SCNC: -7.8 MMOL/L (ref -2–3)
BILIRUB SERPL-MCNC: 0.6 MG/DL (ref 0–1.2)
BLOOD EXPIRATION DATE: NORMAL
BODY TEMPERATURE: 37 DEGREES CELSIUS
BUN SERPL-MCNC: 10 MG/DL (ref 6–23)
CA-I BLDV-SCNC: 1.18 MMOL/L (ref 1.1–1.33)
CALCIUM SERPL-MCNC: 8.4 MG/DL (ref 8.6–10.6)
CHLORIDE BLDV-SCNC: 109 MMOL/L (ref 98–107)
CHLORIDE SERPL-SCNC: 108 MMOL/L (ref 98–107)
CO2 SERPL-SCNC: 19 MMOL/L (ref 21–32)
CREAT SERPL-MCNC: 0.87 MG/DL (ref 0.5–1.05)
DISPENSE STATUS: NORMAL
ERYTHROCYTE [DISTWIDTH] IN BLOOD BY AUTOMATED COUNT: 13.6 % (ref 11.5–14.5)
ERYTHROCYTE [DISTWIDTH] IN BLOOD BY AUTOMATED COUNT: 13.9 % (ref 11.5–14.5)
ERYTHROCYTE [DISTWIDTH] IN BLOOD BY AUTOMATED COUNT: 14.2 % (ref 11.5–14.5)
GFR SERPL CREATININE-BSD FRML MDRD: >90 ML/MIN/1.73M*2
GLUCOSE BLD MANUAL STRIP-MCNC: 101 MG/DL (ref 74–99)
GLUCOSE BLD MANUAL STRIP-MCNC: 104 MG/DL (ref 74–99)
GLUCOSE BLD MANUAL STRIP-MCNC: 106 MG/DL (ref 74–99)
GLUCOSE BLD MANUAL STRIP-MCNC: 111 MG/DL (ref 74–99)
GLUCOSE BLD MANUAL STRIP-MCNC: 113 MG/DL (ref 74–99)
GLUCOSE BLD MANUAL STRIP-MCNC: 116 MG/DL (ref 74–99)
GLUCOSE BLD MANUAL STRIP-MCNC: 126 MG/DL (ref 74–99)
GLUCOSE BLD MANUAL STRIP-MCNC: 147 MG/DL (ref 74–99)
GLUCOSE BLD MANUAL STRIP-MCNC: 190 MG/DL (ref 74–99)
GLUCOSE BLD MANUAL STRIP-MCNC: 85 MG/DL (ref 74–99)
GLUCOSE BLDV-MCNC: 190 MG/DL (ref 74–99)
GLUCOSE SERPL-MCNC: 170 MG/DL (ref 74–99)
HCO3 BLDV-SCNC: 18.3 MMOL/L (ref 22–26)
HCT VFR BLD AUTO: 18.3 % (ref 36–46)
HCT VFR BLD AUTO: 26.8 % (ref 36–46)
HCT VFR BLD AUTO: 26.9 % (ref 36–46)
HCT VFR BLD EST: 26 % (ref 36–46)
HGB BLD-MCNC: 6.1 G/DL (ref 12–16)
HGB BLD-MCNC: 8.7 G/DL (ref 12–16)
HGB BLD-MCNC: 9.2 G/DL (ref 12–16)
HGB BLDV-MCNC: 8.8 G/DL (ref 12–16)
INHALED O2 CONCENTRATION: 21 %
LACTATE BLDV-SCNC: 2.5 MMOL/L (ref 0.4–2)
MCH RBC QN AUTO: 30.5 PG (ref 26–34)
MCH RBC QN AUTO: 31.4 PG (ref 26–34)
MCH RBC QN AUTO: 31.5 PG (ref 26–34)
MCHC RBC AUTO-ENTMCNC: 32.3 G/DL (ref 32–36)
MCHC RBC AUTO-ENTMCNC: 33.3 G/DL (ref 32–36)
MCHC RBC AUTO-ENTMCNC: 34.3 G/DL (ref 32–36)
MCV RBC AUTO: 92 FL (ref 80–100)
MCV RBC AUTO: 94 FL (ref 80–100)
MCV RBC AUTO: 94 FL (ref 80–100)
NRBC BLD-RTO: 0 /100 WBCS (ref 0–0)
OXYHGB MFR BLDV: 79.1 % (ref 45–75)
PCO2 BLDV: 39 MM HG (ref 41–51)
PH BLDV: 7.28 PH (ref 7.33–7.43)
PLATELET # BLD AUTO: 153 X10*3/UL (ref 150–450)
PLATELET # BLD AUTO: 189 X10*3/UL (ref 150–450)
PLATELET # BLD AUTO: 242 X10*3/UL (ref 150–450)
PMV BLD AUTO: 11.5 FL (ref 7.5–11.5)
PMV BLD AUTO: 11.7 FL (ref 7.5–11.5)
PMV BLD AUTO: 11.7 FL (ref 7.5–11.5)
PO2 BLDV: 52 MM HG (ref 35–45)
POTASSIUM BLDV-SCNC: 3.7 MMOL/L (ref 3.5–5.3)
POTASSIUM SERPL-SCNC: 3.9 MMOL/L (ref 3.5–5.3)
PRODUCT BLOOD TYPE: 6200
PRODUCT CODE: NORMAL
PROT SERPL-MCNC: 5.1 G/DL (ref 6.4–8.2)
RBC # BLD AUTO: 1.94 X10*6/UL (ref 4–5.2)
RBC # BLD AUTO: 2.85 X10*6/UL (ref 4–5.2)
RBC # BLD AUTO: 2.92 X10*6/UL (ref 4–5.2)
SAO2 % BLDV: 81 % (ref 45–75)
SODIUM BLDV-SCNC: 135 MMOL/L (ref 136–145)
SODIUM SERPL-SCNC: 138 MMOL/L (ref 136–145)
UNIT ABO: NORMAL
UNIT NUMBER: NORMAL
UNIT RH: NORMAL
UNIT VOLUME: 323
UNIT VOLUME: 350
WBC # BLD AUTO: 16 X10*3/UL (ref 4.4–11.3)
WBC # BLD AUTO: 19.3 X10*3/UL (ref 4.4–11.3)
WBC # BLD AUTO: 21.8 X10*3/UL (ref 4.4–11.3)
XM INTEP: NORMAL

## 2023-10-10 PROCEDURE — 2500000004 HC RX 250 GENERAL PHARMACY W/ HCPCS (ALT 636 FOR OP/ED): Performed by: STUDENT IN AN ORGANIZED HEALTH CARE EDUCATION/TRAINING PROGRAM

## 2023-10-10 PROCEDURE — 99232 SBSQ HOSP IP/OBS MODERATE 35: CPT | Performed by: STUDENT IN AN ORGANIZED HEALTH CARE EDUCATION/TRAINING PROGRAM

## 2023-10-10 PROCEDURE — 85027 COMPLETE CBC AUTOMATED: CPT | Performed by: STUDENT IN AN ORGANIZED HEALTH CARE EDUCATION/TRAINING PROGRAM

## 2023-10-10 PROCEDURE — 96372 THER/PROPH/DIAG INJ SC/IM: CPT | Performed by: STUDENT IN AN ORGANIZED HEALTH CARE EDUCATION/TRAINING PROGRAM

## 2023-10-10 PROCEDURE — 2500000001 HC RX 250 WO HCPCS SELF ADMINISTERED DRUGS (ALT 637 FOR MEDICARE OP): Performed by: STUDENT IN AN ORGANIZED HEALTH CARE EDUCATION/TRAINING PROGRAM

## 2023-10-10 PROCEDURE — 82010 KETONE BODYS QUAN: CPT | Performed by: STUDENT IN AN ORGANIZED HEALTH CARE EDUCATION/TRAINING PROGRAM

## 2023-10-10 PROCEDURE — 2500000001 HC RX 250 WO HCPCS SELF ADMINISTERED DRUGS (ALT 637 FOR MEDICARE OP)

## 2023-10-10 PROCEDURE — 36430 TRANSFUSION BLD/BLD COMPNT: CPT

## 2023-10-10 PROCEDURE — 36415 COLL VENOUS BLD VENIPUNCTURE: CPT | Performed by: STUDENT IN AN ORGANIZED HEALTH CARE EDUCATION/TRAINING PROGRAM

## 2023-10-10 PROCEDURE — 2500000002 HC RX 250 W HCPCS SELF ADMINISTERED DRUGS (ALT 637 FOR MEDICARE OP, ALT 636 FOR OP/ED): Performed by: STUDENT IN AN ORGANIZED HEALTH CARE EDUCATION/TRAINING PROGRAM

## 2023-10-10 PROCEDURE — 82947 ASSAY GLUCOSE BLOOD QUANT: CPT

## 2023-10-10 PROCEDURE — 85018 HEMOGLOBIN: CPT | Performed by: STUDENT IN AN ORGANIZED HEALTH CARE EDUCATION/TRAINING PROGRAM

## 2023-10-10 PROCEDURE — 1100000001 HC PRIVATE ROOM DAILY

## 2023-10-10 PROCEDURE — P9016 RBC LEUKOCYTES REDUCED: HCPCS

## 2023-10-10 PROCEDURE — 82435 ASSAY OF BLOOD CHLORIDE: CPT | Performed by: STUDENT IN AN ORGANIZED HEALTH CARE EDUCATION/TRAINING PROGRAM

## 2023-10-10 PROCEDURE — 85027 COMPLETE CBC AUTOMATED: CPT

## 2023-10-10 PROCEDURE — 99199 UNLISTED SPECIAL SVC PX/RPRT: CPT

## 2023-10-10 RX ORDER — DEXTROSE MONOHYDRATE 100 MG/ML
0.3 INJECTION, SOLUTION INTRAVENOUS ONCE AS NEEDED
Status: DISCONTINUED | OUTPATIENT
Start: 2023-10-10 | End: 2023-10-10

## 2023-10-10 RX ORDER — ACETAMINOPHEN 325 MG/1
650 TABLET ORAL EVERY 6 HOURS
Status: DISCONTINUED | OUTPATIENT
Start: 2023-10-10 | End: 2023-10-10

## 2023-10-10 RX ORDER — DEXTROSE MONOHYDRATE 100 MG/ML
100 INJECTION, SOLUTION INTRAVENOUS AS NEEDED
Status: DISCONTINUED | OUTPATIENT
Start: 2023-10-10 | End: 2023-10-10

## 2023-10-10 RX ORDER — INSULIN GLARGINE 100 [IU]/ML
6 INJECTION, SOLUTION SUBCUTANEOUS ONCE
Status: DISCONTINUED | OUTPATIENT
Start: 2023-10-10 | End: 2023-10-10

## 2023-10-10 RX ORDER — DEXTROSE 50 % IN WATER (D50W) INTRAVENOUS SYRINGE
25
Status: DISCONTINUED | OUTPATIENT
Start: 2023-10-10 | End: 2023-10-11 | Stop reason: HOSPADM

## 2023-10-10 RX ORDER — KETOROLAC TROMETHAMINE 10 MG/1
30 TABLET, FILM COATED ORAL EVERY 6 HOURS
Status: DISCONTINUED | OUTPATIENT
Start: 2023-10-10 | End: 2023-10-10

## 2023-10-10 RX ORDER — DEXTROSE 50 % IN WATER (D50W) INTRAVENOUS SYRINGE
25
Status: DISCONTINUED | OUTPATIENT
Start: 2023-10-10 | End: 2023-10-10

## 2023-10-10 RX ORDER — SODIUM CHLORIDE 9 MG/ML
25 INJECTION, SOLUTION INTRAVENOUS CONTINUOUS PRN
Status: DISCONTINUED | OUTPATIENT
Start: 2023-10-10 | End: 2023-10-10

## 2023-10-10 RX ORDER — INSULIN LISPRO 100 [IU]/ML
6 INJECTION, SOLUTION INTRAVENOUS; SUBCUTANEOUS ONCE
Status: COMPLETED | OUTPATIENT
Start: 2023-10-10 | End: 2023-10-10

## 2023-10-10 RX ORDER — INSULIN GLARGINE 100 [IU]/ML
8 INJECTION, SOLUTION SUBCUTANEOUS ONCE
Status: DISCONTINUED | OUTPATIENT
Start: 2023-10-10 | End: 2023-10-10

## 2023-10-10 RX ORDER — DEXTROSE MONOHYDRATE 100 MG/ML
50 INJECTION, SOLUTION INTRAVENOUS CONTINUOUS PRN
Status: DISCONTINUED | OUTPATIENT
Start: 2023-10-10 | End: 2023-10-10

## 2023-10-10 RX ORDER — DEXTROSE MONOHYDRATE 100 MG/ML
0.3 INJECTION, SOLUTION INTRAVENOUS ONCE AS NEEDED
Status: DISCONTINUED | OUTPATIENT
Start: 2023-10-10 | End: 2023-10-11 | Stop reason: HOSPADM

## 2023-10-10 RX ADMIN — KETOROLAC TROMETHAMINE 30 MG: 30 INJECTION, SOLUTION INTRAMUSCULAR; INTRAVENOUS at 06:44

## 2023-10-10 RX ADMIN — IBUPROFEN 600 MG: 600 TABLET, FILM COATED ORAL at 18:29

## 2023-10-10 RX ADMIN — IBUPROFEN 600 MG: 600 TABLET, FILM COATED ORAL at 12:58

## 2023-10-10 RX ADMIN — ACETAMINOPHEN 975 MG: 325 TABLET ORAL at 18:29

## 2023-10-10 RX ADMIN — INSULIN LISPRO 6 UNITS: 100 INJECTION, SOLUTION INTRAVENOUS; SUBCUTANEOUS at 04:12

## 2023-10-10 RX ADMIN — SODIUM CHLORIDE 25 ML/HR: 9 INJECTION, SOLUTION INTRAVENOUS at 05:36

## 2023-10-10 RX ADMIN — SODIUM CHLORIDE, SODIUM LACTATE, POTASSIUM CHLORIDE, CALCIUM CHLORIDE AND DEXTROSE MONOHYDRATE 125 ML/HR: 5; 600; 310; 30; 20 INJECTION, SOLUTION INTRAVENOUS at 06:55

## 2023-10-10 RX ADMIN — SIMETHICONE 80 MG: 80 TABLET, CHEWABLE ORAL at 20:42

## 2023-10-10 RX ADMIN — KETOROLAC TROMETHAMINE 30 MG: 30 INJECTION, SOLUTION INTRAMUSCULAR; INTRAVENOUS at 01:19

## 2023-10-10 RX ADMIN — INSULIN HUMAN 0.5 UNITS/HR: 1 INJECTION, SOLUTION INTRAVENOUS at 05:37

## 2023-10-10 RX ADMIN — ACETAMINOPHEN 975 MG: 325 TABLET ORAL at 12:05

## 2023-10-10 ASSESSMENT — PAIN SCALES - GENERAL
PAINLEVEL_OUTOF10: 0 - NO PAIN
PAINLEVEL_OUTOF10: 3
PAINLEVEL_OUTOF10: 2
PAINLEVEL_OUTOF10: 0 - NO PAIN
PAINLEVEL_OUTOF10: 0 - NO PAIN
PAINLEVEL_OUTOF10: 3
PAINLEVEL_OUTOF10: 1
PAINLEVEL_OUTOF10: 0 - NO PAIN
PAINLEVEL_OUTOF10: 0 - NO PAIN
PAINLEVEL_OUTOF10: 1
PAINLEVEL_OUTOF10: 1
PAINLEVEL_OUTOF10: 0 - NO PAIN

## 2023-10-10 ASSESSMENT — PAIN DESCRIPTION - DESCRIPTORS: DESCRIPTORS: ACHING;BURNING

## 2023-10-10 NOTE — PROGRESS NOTES
Postpartum Progress Note    Assessment/Plan   Becca Irene is a 28 y.o., , who delivered at 37w2d gestation and is now postpartum day 1.    Post op Care  - pLTCS 10/9, arrest of descent, L extension, EBL 3L, L josé, B-Rollins, hemabate x2, intrauterine pit, buccal cyto, Yaneli s/p removal,   Hgb 11.4 > 1u reds 1 FFP intra-op > 9.9, follow up AM, fibrinogen 332, coags wnl   - pain control with duramorph, tylenol, toradol. Transition to oral medications today.  - continue IVF until tolerating adequate diet   - diabetic diet  - cueva out, voiding spontaneously    gHTN  - dx by mild ranges greater than 4 hours apart  - HELLP Labs neg, P:C 0.18  - Asymptomatic     T1DM  - postoperatively found that patient's pump was detached from her body not receiving basal insulin.  - BG in high 200s to 300s --> insulin gtt on MAC2 to obtain glycemic control due to boluses with pump not being sufficient. Will plan to transition to pump with the following settings overlapping the pump and the insulin gtt. Plan to keep on MAC2 until proven glycemic control with pump alone.    - BHB wnl, no AG, +2 ketones, last glucose 147  - q1 hour BG checks while on insulin gtt  - Plan for future pump tandem/dexcom suing control IQ:   Postpartum:   - Resume control IQ  - New settings:  Basal                     Carb Ratio         CF     [id number] 0.80          1:11            1:35  [id number] 0.85          1:11            1:35   [id number] 0.65          1:7              1:35  [id number] 0.60          1:6              1:35  [id number] 0.60          1:7              1:35     Target 130  Insulin DOA 5 hrs  Max bolus 25    PPBC  - discussed options for postpartum birth control  - Patient desires POPs    Dispo  - Anticipate DC on PPD#3 pending no complications  - Plan for incision check in 2 weeks and PPV in 4-6 weeks with primary OB    Gila Garcia MD  Seen and discussed with Dr. March    Active Problems:    Gastroesophageal reflux disease     Depression      Subjective   Her pain is well controlled with current medications  She is not passing flatus  She is not ambulating well  She is tolerating a Adult diet Regular  She reports no breast or nursing problems  She denies emotional concerns today   Her plan for contraception is oral contraceptives     She has no lightheadedness with minimal vaginal bleeding    Objective   Allergies:   Patient has no known allergies.         Last Vitals:  Temp Pulse Resp BP MAP Pulse Ox   36.8 °C (98.2 °F) 92 18 114/69 86 mmHg 100 %     Vitals Min/Max Last 24 Hours:  Temp  Min: 36.1 °C (97 °F)  Max: 37.1 °C (98.8 °F)  Pulse  Min: 67  Max: 119  Resp  Min: 16  Max: 20  BP  Min: 108/68  Max: 137/75  MAP  Min: 78 mmHg  Max: 99 mmHg    Intake/Output:     Intake/Output Summary (Last 24 hours) at 10/10/2023 0718  Last data filed at 10/10/2023 0700  Gross per 24 hour   Intake 2193.82 ml   Output 4145 ml   Net -1951.18 ml       Physical Exam:  General: Examination reveals a well developed, well nourished, female, in no acute distress. She is alert and cooperative.  Lungs: clear to auscultation bilaterally.  Abdomen: soft, gravid, nontender, nondistended, no abnormal masses, no epigastric pain.  Incision: covered with bandage, CDI.  Fundus: firm and below umbilicus.  Extremities: no redness or tenderness in the calves or thighs, no edema.  Neurological: DTRs normal and symmetrical.  Psychological: awake and alert; oriented to person, place, and time.    Lab Data:  Lab Results   Component Value Date    ABO A 10/09/2023    LABRH POS 10/09/2023    ABSCRN NEG 10/09/2023     Lab Results   Component Value Date    WBC 19.3 (H) 10/09/2023    HGB 8.7 (L) 10/09/2023    HCT 26.9 (L) 10/09/2023     10/09/2023

## 2023-10-10 NOTE — INDIVIDUALIZED OVERALL PLAN OF CARE NOTE
To bedside after patient with episodes of hyperglycemia not responsive to bolus through pump.    Initially called by nurse for blood sugar of 280. Encouraged patient to bolus through pump and recheck in 30 minutes.     Upon recheck, patient had realized that insulin pump had been disconnected. Unsure of how long insulin pump has been disconnected for.  helped to reconnect insulin pump and patient gave herself two boluses through pump, BG at that time 300. Planned to recheck BG in 30 minutes.    After 30 minutes, . Patient reports that her blood sugar feels high and she's sleepy, denies shakiness, nausea, vomiting, lightheadedness.    Patient states that she is not 100% certain that her pump is currently working but did reconnect it. States that in this scenario she would usually give herself 6 units subQ lispro and recheck.    Plan:  - Will give patient 6u lispro now  - Udip with 1+ glucose and 2+ ketones  - Given that pump is possibly malfunctioning and patient with ketonuria, concern for DKA.  Will turn off pump and transfer to Mac2 for insulin gtt  - CBC, CMP, VBG, BHB ordered    Discussed with Dr. Figueroa and Dr. Buffy Lee MD  PGY-3, Obstetrics and Gynecology

## 2023-10-10 NOTE — CARE PLAN
VS and assessment stable.   Problem: Hypertensive Disorder of Pregnancy (HDP)  Goal: Minimal s/sx of HDP and BP<160/110  Outcome: Progressing  Goal: Adequate urine output (0.5 ml/kg/hr)  Outcome: Progressing     Problem: Pain - Adult  Goal: Verbalizes/displays adequate comfort level or baseline comfort level  Outcome: Progressing     Problem: Safety - Adult  Goal: Free from fall injury  Outcome: Progressing     Problem: Metabolic/Fluid and Electrolytes - Adult  Goal: Electrolytes maintained within normal limits  Outcome: Progressing  Goal: Hemodynamic stability and optimal renal function maintained  Outcome: Progressing  Goal: Glucose maintained within prescribed range  Outcome: Progressing     Problem: Skin  Goal: Promote skin healing  Outcome: Progressing     Problem: Postpartum  Goal: Incisions, wounds, or drain sites healing without S/S of infection  Outcome: Progressing  Goal: No s/sx infection  Outcome: Progressing  Goal: No s/sx of hemorrhage  Outcome: Progressing     Problem:  Recovery Care  Goal: Verbalizes understanding of post-op instructions  Outcome: Progressing  Goal: Manages discomfort  Outcome: Progressing     Problem: Postpartum hemorrhage  Goal: Hemodynamic stability and limit blood loss  Outcome: Progressing     Problem: Discharge Planning  Goal: Discharge to home or other facility with appropriate resources  Outcome: Progressing

## 2023-10-10 NOTE — PROGRESS NOTES
Postpartum Progress Note    Assessment/Plan   Becca Irene is a 28 y.o., G1 now  PPD#1 from pLTCS at 37w, c/b PPH of 3L, currently on insulin gtt for T1DM.     Post op Care  - pLTCS 10/9, arrest of descent, L extension, EBL 3L, L josé, B-Rollins, hemabate x2, intrauterine pit, buccal cyto, Yaneli s/p removal,   Hgb 11.4 > 1u pRBCs + 1 FFP intra-op > 9.0 - 8.7, fibrinogen 332, coags wnl -> POD#1 Hgb 6.1, transfuse 2u pRBCs now and follow up post-transfusion CBC   - pain control with duramorph, tylenol, toradol. Transition to oral medications today.  - continue IVF while on insulin gtt   - diabetic diet  - cueva remains in place, plan to discontinue POD#1    gHTN  - dx by mild ranges greater than 4 hours apart  - HELLP Labs neg, P:C 0.18  - Asymptomatic     T1DM  - postoperatively found that patient's pump was detached from her body not receiving basal insulin.  - BG in high 200s to 300s --> insulin gtt on MAC2 to obtain glycemic control due to boluses with pump not being sufficient. Will plan to transition to pump with the following settings overlapping the pump and the insulin gtt. Plan to keep on MAC2 until proven glycemic control with pump alone.    - BHB wnl, no AG, +2 ketones, last glucose 147   - q1 hour BG checks while on insulin gtt  - Plan for future pump tandem/dexcom suing control IQ:   Postpartum:   - Resume control IQ  - New settings:  Basal                     Carb Ratio         CF     [id number] 0.80          1:11            1:35  8462-0266 0.85          1:11            1:35   [id number] 0.65          1:7              1:35  [id number] 0.60          1:6              1:35  [id number] 0.60          1:7              1:35     Target 130  Insulin DOA 5 hrs  Max bolus 25    PPBC  - discussed options for postpartum birth control  - Patient desires POPs    Dispo  - Anticipate DC on POD#3-4 pending no complications  - Plan for BP check within 1 week, incision check in 2 weeks, and PPV in 4-6 weeks with primary  OB    Savanna Arciniega MD  Seen and discussed with Dr. Sheridan     Active Problems:    Gastroesophageal reflux disease    Depression      Subjective   Feeling well this morning. Denies lightheadedness, dizziness, n/v. Had been up and ambulating, and tolerating regular diet before needing to come to L&D overnight for insulin gtt.     Objective   Allergies:   Patient has no known allergies.         Last Vitals:  Temp Pulse Resp BP MAP Pulse Ox   36.4 °C (97.5 °F) 92 18 110/57 86 mmHg 100 %     Vitals Min/Max Last 24 Hours:  Temp  Min: 36.2 °C (97.2 °F)  Max: 37.1 °C (98.8 °F)  Pulse  Min: 67  Max: 119  Resp  Min: 16  Max: 20  BP  Min: 101/60  Max: 137/75  MAP  Min: 78 mmHg  Max: 99 mmHg    Intake/Output:     Intake/Output Summary (Last 24 hours) at 10/10/2023 0838  Last data filed at 10/10/2023 0700  Gross per 24 hour   Intake 2196.11 ml   Output 4095 ml   Net -1898.89 ml       Physical Exam:  General: Examination reveals a well developed, well nourished, female, in no acute distress. She is alert and cooperative.  Lungs: clear to auscultation bilaterally.  Abdomen: soft, gravid, nontender, nondistended, no abnormal masses, no epigastric pain.  Incision: covered with bandage, CDI.  Fundus: firm and below umbilicus.  Extremities: no redness or tenderness in the calves or thighs, no edema.  Neurological: DTRs normal and symmetrical.  Psychological: awake and alert; oriented to person, place, and time.    Lab Data:  Lab Results   Component Value Date    ABO A 10/09/2023    LABRH POS 10/09/2023    ABSCRN NEG 10/09/2023     Lab Results   Component Value Date    WBC 21.8 (H) 10/10/2023    HGB 6.1 (LL) 10/10/2023    HCT 18.3 (L) 10/10/2023     10/10/2023

## 2023-10-10 NOTE — SIGNIFICANT EVENT
Patient meets criteria for home monitoring of blood pressure post discharge.  Met with patient to assess for availability of home BP monitor.  Patient stated she owns home BP monitor. Patient educated on importance of continuing to monitor BP at home, recording BP on home monitoring log and s/sx of when to call her provider.  Pt verbalized understanding the above information.

## 2023-10-10 NOTE — INDIVIDUALIZED OVERALL PLAN OF CARE NOTE
Insulin pump restarted this morning around 1030. Insulin gtt continued for 30mins overlapping with reinitiation of pump.   Pump restarted with following settings:  - Resume control IQ  - Postpartum settings:  Basal                     Carb Ratio         CF     [id number] 0.80          1:11            1:35  [id number] 0.85          1:11            1:35   2546-1282 0.65          1:7              1:35  [id number] 0.60          1:6              1:35  6022-2720 0.60          1:7              1:35    After 30mins on pump + drip,  and therefore insulin gtt discontinued. For past 2 hrs, patient has been able to tolerate small meals and able to bolus through pump. BG has been in 110s-120s since. Stable for transfer to [facility] service. Received 2u pRBCs on L&D for POD#1 Hgb 6.1, will fu post-transfusion CBC.     D/w Dr. Rafael Arciniega MD PGY-4  Obstetrics and Gynecology

## 2023-10-10 NOTE — DISCHARGE INSTRUCTIONS
May shower, pat incision line dry and leave open to air; may cover incision with gauze if the incisions rubs against clothing.  Avoid scrubbing, direct water pressure to the incision until it is healed.  Avoid tub baths, hot tubs and swimming until the incision is healed.    You may do stairs as tolerated.  Use the handrail and have someone with you until you regain your strength.    Please take your blood pressure twice a day.     If your blood pressure is >140 systolic (top number) or >90 diastolic (bottom number), please call your doctor's office.   If your blood pressure is >160 systolic, or >110 diastolic, please go to Labor and Delivery.    If you have a headache that does not resolve with tylenol, vision changes, chest pain, or new onset shortness of breath, please go to Labor and Delivery.        Post Birth Warning Signs  Any woman can have complications after the birth of a baby including a blood clot, a heart problem, hypertensive disorder/eclampsia, depression, hemorrhage, or infection. Notify all providers of your delivery date up to one year after birth.*       Call 911 or go to nearest emergency room right away if you have: PAIN or pressure in chest; OBSTRUCTED breathing or shortness of breath; SEIZURES; THOUGHTS of hurting yourself or your baby; heart palpitations/racing; change in alertness/confusion.    Call your provider if you have: BLEEDING, soaking through a pad/hour, or blood clots the size of an egg or bigger; INCISION (episiotomy stitches or  site) that is not healing (increased redness, pain, drainage/pus, or separation); RED or swollen leg/calf that is painful or warm to touch, especially in one leg more than the other; TEMPERATURE of 100.4 F or higher or chills; HEADACHE that does not get better with medicine, rest or hydration, or bad headache with vision changes like spots or flashing lights; increased swelling of face, hands or legs; severe cramps or upper right belly pain; red  or swollen breast that is painful or warm to touch; an unusual, foul odor from your vaginal discharge; pain, burning, or difficulty during urination; severe constipation (more than 5 days); feelings of depression (such as depressed mood, loss of interest in enjoyable things, unable to care for yourself, trouble sleeping, lack of appetite, or feeling worthless).     If you can’t reach your provider or symptoms worsen, call 911 or go to nearest emergency room.   *Information obtained from [employer]: Save Your Life: Get Care for These POST-BIRTH Warning Signs              Pacifier Use  “The American Academy of Pediatrics recommends that pacifier use is best avoided during the initiation of breastfeeding and used only after breastfeeding is well established.  In some infants, early pacifier use may interfere with establishment of good breastfeeding practices, whereas in others it may indicate the presence of a breastfeeding problem that requires intervention.  Use of a pacifier may cause problems with latching, and lead to decreased milk supply by missing feeding opportunities.  Pacifiers may be used during painful procedures, but are not otherwise recommended while the infant is learning to breastfeed.”

## 2023-10-10 NOTE — ANESTHESIA POSTPROCEDURE EVALUATION
Patient: Becca Irene    Procedure Summary       Date: 10/10/23 Room / Location:     Anesthesia Start:  Anesthesia Stop:     Procedure: Labor Analgesia Diagnosis:     Scheduled Providers:  Responsible Provider:     Anesthesia Type: Not recorded ASA Status: Not recorded            Anesthesia Type: No value filed.    Vitals Value Taken Time   /60 10/10/23 0810   Temp 36.4 10/10/23 0810   Pulse 88 10/10/23 0810   Resp 18 10/10/23 0810   SpO2 100 10/10/23 0810       Anesthesia Post Evaluation    Patient location during evaluation: bedside  Patient participation: complete - patient participated  Level of consciousness: awake and alert  Pain management: adequate  Airway patency: patent  Cardiovascular status: acceptable  Respiratory status: acceptable  Hydration status: acceptable  Comments: Neuraxial site assessed. Mild bruising, no visible redness or swelling. Pain acceptably controlled. Patient able to ambulate and move all extremities without difficulty. Moser catheter still in situ. No complaints of nausea/vomiting. Tolerating PO intake well. No s/sx of PDPHA.        No notable events documented.

## 2023-10-11 LAB
BLOOD EXPIRATION DATE: NORMAL
BLOOD EXPIRATION DATE: NORMAL
DISPENSE STATUS: NORMAL
DISPENSE STATUS: NORMAL
ERYTHROCYTE [DISTWIDTH] IN BLOOD BY AUTOMATED COUNT: 14.6 % (ref 11.5–14.5)
GLUCOSE BLD MANUAL STRIP-MCNC: 132 MG/DL (ref 74–99)
HCT VFR BLD AUTO: 25.8 % (ref 36–46)
HGB BLD-MCNC: 8.4 G/DL (ref 12–16)
MCH RBC QN AUTO: 31 PG (ref 26–34)
MCHC RBC AUTO-ENTMCNC: 32.6 G/DL (ref 32–36)
MCV RBC AUTO: 95 FL (ref 80–100)
NRBC BLD-RTO: 0 /100 WBCS (ref 0–0)
PLATELET # BLD AUTO: 210 X10*3/UL (ref 150–450)
PMV BLD AUTO: 11.1 FL (ref 7.5–11.5)
PRODUCT BLOOD TYPE: 6200
PRODUCT BLOOD TYPE: 6200
PRODUCT CODE: NORMAL
PRODUCT CODE: NORMAL
RBC # BLD AUTO: 2.71 X10*6/UL (ref 4–5.2)
UNIT ABO: NORMAL
UNIT ABO: NORMAL
UNIT NUMBER: NORMAL
UNIT NUMBER: NORMAL
UNIT RH: NORMAL
UNIT RH: NORMAL
UNIT VOLUME: 350
UNIT VOLUME: 350
WBC # BLD AUTO: 13.7 X10*3/UL (ref 4.4–11.3)
XM INTEP: NORMAL
XM INTEP: NORMAL

## 2023-10-11 PROCEDURE — 99239 HOSP IP/OBS DSCHRG MGMT >30: CPT | Performed by: STUDENT IN AN ORGANIZED HEALTH CARE EDUCATION/TRAINING PROGRAM

## 2023-10-11 PROCEDURE — 85027 COMPLETE CBC AUTOMATED: CPT

## 2023-10-11 PROCEDURE — 82947 ASSAY GLUCOSE BLOOD QUANT: CPT

## 2023-10-11 PROCEDURE — 2500000001 HC RX 250 WO HCPCS SELF ADMINISTERED DRUGS (ALT 637 FOR MEDICARE OP)

## 2023-10-11 PROCEDURE — 36415 COLL VENOUS BLD VENIPUNCTURE: CPT

## 2023-10-11 RX ORDER — ACETAMINOPHEN 325 MG/1
975 TABLET ORAL EVERY 6 HOURS PRN
Qty: 90 TABLET | Refills: 1 | Status: SHIPPED | OUTPATIENT
Start: 2023-10-11 | End: 2023-12-10

## 2023-10-11 RX ORDER — OXYCODONE HYDROCHLORIDE 5 MG/1
5 TABLET ORAL EVERY 6 HOURS PRN
Qty: 10 TABLET | Refills: 0 | Status: SHIPPED | OUTPATIENT
Start: 2023-10-11 | End: 2024-01-18 | Stop reason: ALTCHOICE

## 2023-10-11 RX ORDER — IBUPROFEN 600 MG/1
600 TABLET ORAL EVERY 6 HOURS
Qty: 120 TABLET | Refills: 1 | Status: SHIPPED | OUTPATIENT
Start: 2023-10-11 | End: 2023-12-10

## 2023-10-11 RX ADMIN — IBUPROFEN 600 MG: 600 TABLET, FILM COATED ORAL at 00:39

## 2023-10-11 RX ADMIN — IBUPROFEN 600 MG: 600 TABLET, FILM COATED ORAL at 06:29

## 2023-10-11 RX ADMIN — IBUPROFEN 600 MG: 600 TABLET, FILM COATED ORAL at 12:32

## 2023-10-11 RX ADMIN — SIMETHICONE 80 MG: 80 TABLET, CHEWABLE ORAL at 12:32

## 2023-10-11 RX ADMIN — ACETAMINOPHEN 975 MG: 325 TABLET ORAL at 12:31

## 2023-10-11 RX ADMIN — ACETAMINOPHEN 975 MG: 325 TABLET ORAL at 00:39

## 2023-10-11 RX ADMIN — SIMETHICONE 80 MG: 80 TABLET, CHEWABLE ORAL at 04:47

## 2023-10-11 RX ADMIN — ACETAMINOPHEN 975 MG: 325 TABLET ORAL at 06:29

## 2023-10-11 ASSESSMENT — PAIN SCALES - GENERAL
PAINLEVEL_OUTOF10: 2
PAINLEVEL_OUTOF10: 2
PAINLEVEL_OUTOF10: 0 - NO PAIN
PAINLEVEL_OUTOF10: 2
PAINLEVEL_OUTOF10: 2

## 2023-10-11 NOTE — DISCHARGE SUMMARY
Discharge Summary    Admission Date: 10/7/2023  Discharge Date: 10/11/2023    Discharge Diagnosis  Post op from pCS    Hospital Course  Delivery Date: 10/9/2023  12:16 PM   Delivery type: , Low Transverse    GA at delivery: 37w2d  Outcome: Living   Anesthesia during delivery: Epidural   Intrapartum complications: Failure to Progress in Second Stage;Hemorrhage   Feeding method: Breastfeeding Status: No   Contraception: POPs    Patient admitted on 10/7 for IOL a 37.0 in the setting of T1DM and gHTN. During her labor her T1DM was maintained with her pump titrating based on cervical ripening and active labor as well as oxytocin administration. Patient had arrest of descent and required a pCS. The CS was complicated by postpartum hemorrhage in the setting of an extension, L josé, B-Rollins, hemabate x2, intrauterine pit, buccal cyto, and Yaneli with a total EBL 3L. 3u pRBC 1 FFP were transfused in total and her blood counts mervat appropriately. Postpartum settings were applied to her insulin pump with no issues with glycemic control in the postpartum period. On POD2 she was meeting all postop milestones and was scheduled for a 2 week follow up visit for incision check, BP check, and glucose checks with MFM. Return precuations were provided for preeclampsia and postop status.     Pertinent Physical Exam At Time of Discharge    General: Examination reveals a well developed, well nourished, female, in no acute distress. She is alert and cooperative.  Lungs: clear to auscultation bilaterally.  Abdomen: soft, gravid, nontender, nondistended, no abnormal masses, no epigastric pain.  Incision: CDI.  Fundus: firm and below umbilicus.  Extremities: no redness or tenderness in the calves or thighs, no edema.  Neurological: DTRs normal and symmetrical.  Psychological: awake and alert; oriented to person, place, and time.       Discharge Meds     Your medication list        START taking these medications        Instructions  Last Dose Given Next Dose Due   acetaminophen 325 mg tablet  Commonly known as: Tylenol      Take 3 tablets (975 mg) by mouth every 6 hours if needed for mild pain (1 - 3).       drospirenone (contraceptive) 4 mg (28) tablet      Take 1 tablet by mouth once daily.       ibuprofen 600 mg tablet      Take 1 tablet (600 mg) by mouth every 6 hours.       oxyCODONE 5 mg immediate release tablet  Commonly known as: Roxicodone      Take 1 tablet (5 mg) by mouth every 6 hours if needed for moderate pain (4 - 6) or severe pain (7 - 10).              CONTINUE taking these medications        Instructions Last Dose Given Next Dose Due   ammonium lactate 12 % cream  Commonly known as: Amlactin           benzoyl peroxide 4 % external liquid  Commonly known as: Brevoxyl           Contour Next Test Strips strip  Generic drug: blood sugar diagnostic           Dexcom G6 Transmitter device  Generic drug: Dexcom G4 platinum transmitter      Use one every 90 days       glucagon 1 mg injection  Commonly known as: Glucagen           Ketostix strip  Generic drug: acetone (urine) test           triamcinolone 0.1 % cream  Commonly known as: Kenalog                     Where to Get Your Medications        These medications were sent to [id number] [address] [address] [address]      Phone: [phone]   acetaminophen 325 mg tablet  drospirenone (contraceptive) 4 mg (28) tablet  ibuprofen 600 mg tablet  oxyCODONE 5 mg immediate release tablet          Complications Requiring Follow-Up  T1DM, gHTN    Test Results Pending At Discharge  Pending Labs       Order Current Status    BLOOD GAS VENOUS In process    BLOOD GAS VENOUS FULL PANEL In process    Blood Gas Lactic Acid, Venous In process    Extra Tubes In process    POCT glucose -  After Meals In process    SST TOP In process    Surgical Pathology Exam - PLACENTA In process            Outpatient Follow-Up  No future  appointments. Requested        Gila Garcia MD

## 2023-10-11 NOTE — CARE PLAN
Problem: Hypertensive Disorder of Pregnancy (HDP)  Goal: Minimal s/sx of HDP and BP<160/110  Outcome: Adequate for Discharge  Goal: Adequate urine output (0.5 ml/kg/hr)  Outcome: Adequate for Discharge     Problem: Pain - Adult  Goal: Verbalizes/displays adequate comfort level or baseline comfort level  Outcome: Adequate for Discharge     Problem: Safety - Adult  Goal: Free from fall injury  Outcome: Adequate for Discharge     Problem: Metabolic/Fluid and Electrolytes - Adult  Goal: Electrolytes maintained within normal limits  Outcome: Adequate for Discharge  Goal: Hemodynamic stability and optimal renal function maintained  Outcome: Adequate for Discharge  Goal: Glucose maintained within prescribed range  Outcome: Adequate for Discharge     Problem: Skin  Goal: Promote skin healing  Outcome: Adequate for Discharge     Problem: Postpartum  Goal: Incisions, wounds, or drain sites healing without S/S of infection  Outcome: Adequate for Discharge  Goal: No s/sx infection  Outcome: Adequate for Discharge  Goal: No s/sx of hemorrhage  Outcome: Adequate for Discharge     Problem:  Recovery Care  Goal: Verbalizes understanding of post-op instructions  Outcome: Adequate for Discharge  Goal: Manages discomfort  Outcome: Adequate for Discharge     Problem: Postpartum hemorrhage  Goal: Hemodynamic stability and limit blood loss  Outcome: Adequate for Discharge     Problem: Discharge Planning  Goal: Discharge to home or other facility with appropriate resources  Outcome: Adequate for Discharge

## 2023-10-11 NOTE — PROGRESS NOTES
Postpartum Progress Note    Assessment/Plan   Becca Irene is a 28 y.o., G1 now  PPD#2 from pLTCS at 37w, c/b PPH of 3L, currently on insulin gtt for T1DM.     Post op Care  - pLTCS 10/9, arrest of descent, L extension, EBL 3L, L josé, B-Rollins, hemabate x2, intrauterine pit, buccal cyto, Yaneli s/p removal,   Hgb 11.4 > 1u pRBCs + 1 FFP intra-op > 9.0 - 8.7, fibrinogen 332, coags wnl -> POD#1 Hgb 6.1--> 2u pRBCs --> 9.2  - pain control per ERAS  - insulin pump as below  - diabetic diet  - meeting postop milestones    gHTN  - dx by mild ranges greater than 4 hours apart  - HELLP Labs neg, P:C 0.18  - Asymptomatic     T1DM  - postoperatively found that patient's pump was detached from her body not receiving basal insulin.  - BG in high 200s to 300s --> insulin gtt on MAC2 to obtain glycemic control due to boluses with pump not being sufficient. Will plan to transition to pump with the following settings overlapping the pump and the insulin gtt. Plan to keep on MAC2 until proven glycemic control with pump alone.    - BHB wnl, no AG, +2 ketones, last glucose 147   - q1 hour BG checks while on insulin gtt  - Plan for future pump tandem/dexcom suing control IQ:   Postpartum:   - Resume control IQ  - New settings:  Basal                     Carb Ratio         CF     [id number] 0.80          1:11            1:35  2418-9152 0.85          1:11            1:35   [id number] 0.65          1:7              1:35  0574-2444 0.60          1:6              1:35  6310-7293 0.60          1:7              1:35     Target 130  Insulin DOA 5 hrs  Max bolus 25    PPBC  - discussed options for postpartum birth control  - Patient desires POPs    Dispo  - Anticipate DC on POD#3-4 pending no complications  - Plan for BP check within 1 week, incision check in 2 weeks, and PPV in 4-6 weeks with primary OB    Amine MD Jose  Seen and discussed with Dr. March     Active Problems:    Gastroesophageal reflux disease     Depression      Subjective   Feeling well this morning. Denies lightheadedness, dizziness, n/v. Patient doing well with no HA, no vision changes, no RUQ pain. She has no CP, no SOB. Pain well controlled with medication.    Objective   Allergies:   Patient has no known allergies.         Last Vitals:  Temp Pulse Resp BP MAP Pulse Ox   36.7 °C (98.1 °F) 85 18 125/77 90 mmHg 93 %     Vitals Min/Max Last 24 Hours:  Temp  Min: 36.1 °C (97 °F)  Max: 37.3 °C (99.1 °F)  Pulse  Min: 81  Max: 111  Resp  Min: 16  Max: 18  BP  Min: 101/60  Max: 132/75  MAP  Min: 90 mmHg  Max: 90 mmHg    Intake/Output:     Intake/Output Summary (Last 24 hours) at 10/11/2023 0730  Last data filed at 10/10/2023 2130  Gross per 24 hour   Intake 1151.9 ml   Output 2075 ml   Net -923.1 ml       Physical Exam:  General: Examination reveals a well developed, well nourished, female, in no acute distress. She is alert and cooperative.  Lungs: clear to auscultation bilaterally.  Abdomen: soft, gravid, nontender, nondistended, no abnormal masses, no epigastric pain.  Incision: CDI.  Fundus: firm and below umbilicus.  Extremities: no redness or tenderness in the calves or thighs, no edema.  Neurological: DTRs normal and symmetrical.  Psychological: awake and alert; oriented to person, place, and time.    Lab Data:  Lab Results   Component Value Date    ABO A 10/09/2023    LABRH POS 10/09/2023    ABSCRN NEG 10/09/2023     Lab Results   Component Value Date    WBC 16.0 (H) 10/10/2023    HGB 9.2 (L) 10/10/2023    HCT 26.8 (L) 10/10/2023     10/10/2023

## 2023-10-12 NOTE — PROGRESS NOTES
Follow-up Phone Call Note:   Interview:  Care Type: Women's Health   Phone Number Call  [phone]   Call Outcome: connected with patient   Patient Reports Feeling (symptoms) Are: better   Which Meds Were New Meds: yes   Which Meds to Continue: yes   Which Meds to Stop: no medications were discontinued   Who participated in medication reconciliation with the hospital staff?: you   In your professional opinion do you think there was a medication discrepancy or potential for medication discrepancy in this situation?: no   Medication Issues: no medication issues   Discharge Instructions Clear: yes   Patient Has a Primary Care Provider: yes   Post-hospitalization Follow-up Occurred According To Schedule: no   Reason: appointment scheduled in future    Delivered Baby(ies): yes   Chest Pain: no   Shortness of breath or difficulty breathing: no   Seizures: no   Any thoughts of hurting yourself or your baby: no   Bleeding that is soaking through one pad/hour or blood clots the size of an egg or bigger: no   Incision that is not healing: no   Red or swollen leg that is painful or warm to the touch: no   Temperature of 100.4F or higher: no   Headache that does not get better, even after taking medicine, or a bad headache with vision changes: no   Where or in what is your baby sleeping?: jo   ABC's of sleep covered: yes   How Are You Feeding Your Baby(ies): breast   Baby Getting Anything Other Than Breastmilk Or Formula For Food: no   Patient Has Primary Care Provider For Baby(ies): yes   Baby Has Been Seen By a Health Care Provider Since Discharge: no-appt 10/13/23   Which Health Care Provider Saw the Baby   Mom's Discharge Date: 10/11/23   Date the Baby Was Seen By a Health Care Provider After Discharge:    Patient Has Plan Specific Name And Number Of Who To Call For Concerns: yes   Stroke Patient: no  Comments:    Date/Time Of Call: 10/12/23 at 1040   Call Back Done By: care coordinator   Callback Complete: yes

## 2023-10-13 ENCOUNTER — HOSPITAL ENCOUNTER (INPATIENT)
Facility: HOSPITAL | Age: 28
LOS: 1 days | Discharge: HOME | End: 2023-10-14
Attending: STUDENT IN AN ORGANIZED HEALTH CARE EDUCATION/TRAINING PROGRAM | Admitting: STUDENT IN AN ORGANIZED HEALTH CARE EDUCATION/TRAINING PROGRAM
Payer: MEDICAID

## 2023-10-13 ENCOUNTER — TELEPHONE (OUTPATIENT)
Dept: OBSTETRICS AND GYNECOLOGY | Facility: CLINIC | Age: 28
End: 2023-10-13
Payer: MEDICAID

## 2023-10-13 VITALS
BODY MASS INDEX: 35.79 KG/M2 | DIASTOLIC BLOOD PRESSURE: 74 MMHG | SYSTOLIC BLOOD PRESSURE: 130 MMHG | HEIGHT: 66 IN | WEIGHT: 222.66 LBS | OXYGEN SATURATION: 99 % | RESPIRATION RATE: 18 BRPM | TEMPERATURE: 97.2 F | HEART RATE: 75 BPM

## 2023-10-13 DIAGNOSIS — O14.13 SEVERE PRE-ECLAMPSIA IN THIRD TRIMESTER (HHS-HCC): ICD-10-CM

## 2023-10-13 DIAGNOSIS — O14.10 SEVERE PRE-ECLAMPSIA, ANTEPARTUM (HHS-HCC): ICD-10-CM

## 2023-10-13 DIAGNOSIS — O10.919: ICD-10-CM

## 2023-10-13 PROBLEM — Z98.891 STATUS POST PRIMARY LOW TRANSVERSE CESAREAN SECTION: Status: ACTIVE | Noted: 2023-10-13

## 2023-10-13 PROBLEM — Z3A.37 37 WEEKS GESTATION OF PREGNANCY (HHS-HCC): Status: RESOLVED | Noted: 2023-10-07 | Resolved: 2023-10-13

## 2023-10-13 LAB
ABO GROUP (TYPE) IN BLOOD: NORMAL
ALBUMIN SERPL BCP-MCNC: 3.1 G/DL (ref 3.4–5)
ALP SERPL-CCNC: 131 U/L (ref 33–110)
ALT SERPL W P-5'-P-CCNC: 14 U/L (ref 7–45)
ANION GAP SERPL CALC-SCNC: 15 MMOL/L (ref 10–20)
ANION GAP SERPL CALC-SCNC: 17 MMOL/L (ref 10–20)
ANTIBODY SCREEN: NORMAL
AST SERPL W P-5'-P-CCNC: 24 U/L (ref 9–39)
BILIRUB SERPL-MCNC: 0.6 MG/DL (ref 0–1.2)
BNP SERPL-MCNC: 685 PG/ML (ref 0–99)
BUN SERPL-MCNC: 9 MG/DL (ref 6–23)
BUN SERPL-MCNC: 9 MG/DL (ref 6–23)
CA-I BLD-SCNC: 1.11 MMOL/L (ref 1.1–1.33)
CALCIUM SERPL-MCNC: 8.2 MG/DL (ref 8.6–10.6)
CALCIUM SERPL-MCNC: 8.6 MG/DL (ref 8.6–10.6)
CHLORIDE SERPL-SCNC: 109 MMOL/L (ref 98–107)
CHLORIDE SERPL-SCNC: 109 MMOL/L (ref 98–107)
CO2 SERPL-SCNC: 21 MMOL/L (ref 21–32)
CO2 SERPL-SCNC: 21 MMOL/L (ref 21–32)
CREAT SERPL-MCNC: 0.62 MG/DL (ref 0.5–1.05)
CREAT SERPL-MCNC: 0.64 MG/DL (ref 0.5–1.05)
ERYTHROCYTE [DISTWIDTH] IN BLOOD BY AUTOMATED COUNT: 14 % (ref 11.5–14.5)
GFR SERPL CREATININE-BSD FRML MDRD: >90 ML/MIN/1.73M*2
GFR SERPL CREATININE-BSD FRML MDRD: >90 ML/MIN/1.73M*2
GLUCOSE BLD MANUAL STRIP-MCNC: 83 MG/DL (ref 74–99)
GLUCOSE SERPL-MCNC: 119 MG/DL (ref 74–99)
GLUCOSE SERPL-MCNC: 73 MG/DL (ref 74–99)
HCT VFR BLD AUTO: 29.5 % (ref 36–46)
HGB BLD-MCNC: 9.7 G/DL (ref 12–16)
LABORATORY COMMENT REPORT: NORMAL
MAGNESIUM SERPL-MCNC: 1.53 MG/DL (ref 1.6–2.4)
MCH RBC QN AUTO: 30.1 PG (ref 26–34)
MCHC RBC AUTO-ENTMCNC: 32.9 G/DL (ref 32–36)
MCV RBC AUTO: 92 FL (ref 80–100)
NRBC BLD-RTO: 0 /100 WBCS (ref 0–0)
PATH REPORT.FINAL DX SPEC: NORMAL
PATH REPORT.GROSS SPEC: NORMAL
PATH REPORT.RELEVANT HX SPEC: NORMAL
PATH REPORT.TOTAL CANCER: NORMAL
PLATELET # BLD AUTO: 321 X10*3/UL (ref 150–450)
PMV BLD AUTO: 10.5 FL (ref 7.5–11.5)
POTASSIUM SERPL-SCNC: 3.8 MMOL/L (ref 3.5–5.3)
POTASSIUM SERPL-SCNC: 4.1 MMOL/L (ref 3.5–5.3)
PROT SERPL-MCNC: 6.1 G/DL (ref 6.4–8.2)
RBC # BLD AUTO: 3.22 X10*6/UL (ref 4–5.2)
RH FACTOR (ANTIGEN D): NORMAL
SODIUM SERPL-SCNC: 141 MMOL/L (ref 136–145)
SODIUM SERPL-SCNC: 143 MMOL/L (ref 136–145)
WBC # BLD AUTO: 11.5 X10*3/UL (ref 4.4–11.3)

## 2023-10-13 PROCEDURE — 2500000004 HC RX 250 GENERAL PHARMACY W/ HCPCS (ALT 636 FOR OP/ED): Performed by: STUDENT IN AN ORGANIZED HEALTH CARE EDUCATION/TRAINING PROGRAM

## 2023-10-13 PROCEDURE — 86850 RBC ANTIBODY SCREEN: CPT | Performed by: STUDENT IN AN ORGANIZED HEALTH CARE EDUCATION/TRAINING PROGRAM

## 2023-10-13 PROCEDURE — 2500000001 HC RX 250 WO HCPCS SELF ADMINISTERED DRUGS (ALT 637 FOR MEDICARE OP): Performed by: STUDENT IN AN ORGANIZED HEALTH CARE EDUCATION/TRAINING PROGRAM

## 2023-10-13 PROCEDURE — 80048 BASIC METABOLIC PNL TOTAL CA: CPT | Mod: CCI | Performed by: STUDENT IN AN ORGANIZED HEALTH CARE EDUCATION/TRAINING PROGRAM

## 2023-10-13 PROCEDURE — 82947 ASSAY GLUCOSE BLOOD QUANT: CPT | Mod: CMCLAB

## 2023-10-13 PROCEDURE — 82330 ASSAY OF CALCIUM: CPT | Mod: CMCLAB | Performed by: STUDENT IN AN ORGANIZED HEALTH CARE EDUCATION/TRAINING PROGRAM

## 2023-10-13 PROCEDURE — 99254 IP/OBS CNSLTJ NEW/EST MOD 60: CPT | Performed by: STUDENT IN AN ORGANIZED HEALTH CARE EDUCATION/TRAINING PROGRAM

## 2023-10-13 PROCEDURE — 83735 ASSAY OF MAGNESIUM: CPT | Mod: CMCLAB | Performed by: STUDENT IN AN ORGANIZED HEALTH CARE EDUCATION/TRAINING PROGRAM

## 2023-10-13 PROCEDURE — 36415 COLL VENOUS BLD VENIPUNCTURE: CPT | Mod: CMCLAB | Performed by: STUDENT IN AN ORGANIZED HEALTH CARE EDUCATION/TRAINING PROGRAM

## 2023-10-13 PROCEDURE — 80053 COMPREHEN METABOLIC PANEL: CPT | Mod: CMCLAB | Performed by: STUDENT IN AN ORGANIZED HEALTH CARE EDUCATION/TRAINING PROGRAM

## 2023-10-13 PROCEDURE — 99223 1ST HOSP IP/OBS HIGH 75: CPT | Performed by: STUDENT IN AN ORGANIZED HEALTH CARE EDUCATION/TRAINING PROGRAM

## 2023-10-13 PROCEDURE — 85027 COMPLETE CBC AUTOMATED: CPT | Mod: CMCLAB | Performed by: STUDENT IN AN ORGANIZED HEALTH CARE EDUCATION/TRAINING PROGRAM

## 2023-10-13 PROCEDURE — 1100000001 HC PRIVATE ROOM DAILY

## 2023-10-13 PROCEDURE — 71046 X-RAY EXAM CHEST 2 VIEWS: CPT | Mod: FOREIGN READ | Performed by: RADIOLOGY

## 2023-10-13 PROCEDURE — 83880 ASSAY OF NATRIURETIC PEPTIDE: CPT | Mod: CMCLAB | Performed by: STUDENT IN AN ORGANIZED HEALTH CARE EDUCATION/TRAINING PROGRAM

## 2023-10-13 RX ORDER — TRANEXAMIC ACID 100 MG/ML
1000 INJECTION, SOLUTION INTRAVENOUS ONCE AS NEEDED
Status: DISCONTINUED | OUTPATIENT
Start: 2023-10-13 | End: 2023-10-14 | Stop reason: HOSPADM

## 2023-10-13 RX ORDER — NIFEDIPINE 30 MG/1
30 TABLET, FILM COATED, EXTENDED RELEASE ORAL
Status: DISCONTINUED | OUTPATIENT
Start: 2023-10-13 | End: 2023-10-14 | Stop reason: HOSPADM

## 2023-10-13 RX ORDER — KETOROLAC TROMETHAMINE 30 MG/ML
30 INJECTION, SOLUTION INTRAMUSCULAR; INTRAVENOUS EVERY 6 HOURS
Status: DISCONTINUED | OUTPATIENT
Start: 2023-10-13 | End: 2023-10-13

## 2023-10-13 RX ORDER — FUROSEMIDE 10 MG/ML
20 INJECTION INTRAMUSCULAR; INTRAVENOUS ONCE
Status: COMPLETED | OUTPATIENT
Start: 2023-10-13 | End: 2023-10-13

## 2023-10-13 RX ORDER — LIDOCAINE 560 MG/1
1 PATCH PERCUTANEOUS; TOPICAL; TRANSDERMAL
Status: DISCONTINUED | OUTPATIENT
Start: 2023-10-13 | End: 2023-10-14 | Stop reason: HOSPADM

## 2023-10-13 RX ORDER — NIFEDIPINE 10 MG/1
10 CAPSULE ORAL ONCE AS NEEDED
Status: DISCONTINUED | OUTPATIENT
Start: 2023-10-13 | End: 2023-10-14 | Stop reason: HOSPADM

## 2023-10-13 RX ORDER — HYDRALAZINE HYDROCHLORIDE 20 MG/ML
5 INJECTION INTRAMUSCULAR; INTRAVENOUS ONCE AS NEEDED
Status: DISCONTINUED | OUTPATIENT
Start: 2023-10-13 | End: 2023-10-14 | Stop reason: HOSPADM

## 2023-10-13 RX ORDER — OXYTOCIN/0.9 % SODIUM CHLORIDE 30/500 ML
60 PLASTIC BAG, INJECTION (ML) INTRAVENOUS
Status: DISCONTINUED | OUTPATIENT
Start: 2023-10-13 | End: 2023-10-14 | Stop reason: HOSPADM

## 2023-10-13 RX ORDER — IBUPROFEN 600 MG/1
600 TABLET ORAL EVERY 6 HOURS
Status: DISCONTINUED | OUTPATIENT
Start: 2023-10-14 | End: 2023-10-13

## 2023-10-13 RX ORDER — LIDOCAINE HYDROCHLORIDE 10 MG/ML
0.5 INJECTION INFILTRATION; PERINEURAL ONCE AS NEEDED
Status: DISCONTINUED | OUTPATIENT
Start: 2023-10-13 | End: 2023-10-14 | Stop reason: HOSPADM

## 2023-10-13 RX ORDER — NALOXONE HYDROCHLORIDE 0.4 MG/ML
0.1 INJECTION, SOLUTION INTRAMUSCULAR; INTRAVENOUS; SUBCUTANEOUS EVERY 5 MIN PRN
Status: DISCONTINUED | OUTPATIENT
Start: 2023-10-13 | End: 2023-10-14 | Stop reason: HOSPADM

## 2023-10-13 RX ORDER — LABETALOL HYDROCHLORIDE 5 MG/ML
20 INJECTION, SOLUTION INTRAVENOUS ONCE AS NEEDED
Status: DISCONTINUED | OUTPATIENT
Start: 2023-10-13 | End: 2023-10-14 | Stop reason: HOSPADM

## 2023-10-13 RX ORDER — MEDROXYPROGESTERONE ACETATE 150 MG/ML
150 INJECTION, SUSPENSION INTRAMUSCULAR ONCE AS NEEDED
Status: DISCONTINUED | OUTPATIENT
Start: 2023-10-13 | End: 2023-10-14 | Stop reason: HOSPADM

## 2023-10-13 RX ORDER — DIPHENHYDRAMINE HYDROCHLORIDE 50 MG/ML
25 INJECTION INTRAMUSCULAR; INTRAVENOUS EVERY 4 HOURS PRN
Status: DISCONTINUED | OUTPATIENT
Start: 2023-10-13 | End: 2023-10-14 | Stop reason: HOSPADM

## 2023-10-13 RX ORDER — SIMETHICONE 80 MG
80 TABLET,CHEWABLE ORAL 4 TIMES DAILY PRN
Status: DISCONTINUED | OUTPATIENT
Start: 2023-10-13 | End: 2023-10-14 | Stop reason: HOSPADM

## 2023-10-13 RX ORDER — CALCIUM CARBONATE 500(1250)
600 TABLET ORAL ONCE
Status: DISCONTINUED | OUTPATIENT
Start: 2023-10-13 | End: 2023-10-14

## 2023-10-13 RX ORDER — OXYCODONE HYDROCHLORIDE 5 MG/1
10 TABLET ORAL EVERY 4 HOURS PRN
Status: DISCONTINUED | OUTPATIENT
Start: 2023-10-14 | End: 2023-10-14 | Stop reason: HOSPADM

## 2023-10-13 RX ORDER — HYDROMORPHONE HYDROCHLORIDE 1 MG/ML
0.2 INJECTION, SOLUTION INTRAMUSCULAR; INTRAVENOUS; SUBCUTANEOUS EVERY 5 MIN PRN
Status: DISCONTINUED | OUTPATIENT
Start: 2023-10-13 | End: 2023-10-14 | Stop reason: HOSPADM

## 2023-10-13 RX ORDER — IBUPROFEN 600 MG/1
600 TABLET ORAL EVERY 6 HOURS SCHEDULED
Status: DISCONTINUED | OUTPATIENT
Start: 2023-10-13 | End: 2023-10-14 | Stop reason: HOSPADM

## 2023-10-13 RX ORDER — METOCLOPRAMIDE HYDROCHLORIDE 5 MG/ML
10 INJECTION INTRAMUSCULAR; INTRAVENOUS EVERY 6 HOURS PRN
Status: DISCONTINUED | OUTPATIENT
Start: 2023-10-13 | End: 2023-10-14 | Stop reason: HOSPADM

## 2023-10-13 RX ORDER — ONDANSETRON 4 MG/1
4 TABLET, FILM COATED ORAL EVERY 6 HOURS PRN
Status: DISCONTINUED | OUTPATIENT
Start: 2023-10-13 | End: 2023-10-14 | Stop reason: HOSPADM

## 2023-10-13 RX ORDER — ONDANSETRON HYDROCHLORIDE 2 MG/ML
4 INJECTION, SOLUTION INTRAVENOUS EVERY 6 HOURS PRN
Status: DISCONTINUED | OUTPATIENT
Start: 2023-10-13 | End: 2023-10-14 | Stop reason: HOSPADM

## 2023-10-13 RX ORDER — BISACODYL 10 MG/1
10 SUPPOSITORY RECTAL DAILY PRN
Status: DISCONTINUED | OUTPATIENT
Start: 2023-10-13 | End: 2023-10-14 | Stop reason: HOSPADM

## 2023-10-13 RX ORDER — OXYCODONE HYDROCHLORIDE 5 MG/1
5 TABLET ORAL EVERY 4 HOURS PRN
Status: DISCONTINUED | OUTPATIENT
Start: 2023-10-14 | End: 2023-10-14 | Stop reason: HOSPADM

## 2023-10-13 RX ORDER — HYDRALAZINE HYDROCHLORIDE 20 MG/ML
10 INJECTION INTRAMUSCULAR; INTRAVENOUS ONCE
Status: COMPLETED | OUTPATIENT
Start: 2023-10-13 | End: 2023-10-13

## 2023-10-13 RX ORDER — METOCLOPRAMIDE 10 MG/1
10 TABLET ORAL EVERY 6 HOURS PRN
Status: DISCONTINUED | OUTPATIENT
Start: 2023-10-13 | End: 2023-10-14 | Stop reason: HOSPADM

## 2023-10-13 RX ORDER — OXYTOCIN 10 [USP'U]/ML
10 INJECTION, SOLUTION INTRAMUSCULAR; INTRAVENOUS ONCE AS NEEDED
Status: DISCONTINUED | OUTPATIENT
Start: 2023-10-13 | End: 2023-10-14 | Stop reason: HOSPADM

## 2023-10-13 RX ORDER — NALBUPHINE HYDROCHLORIDE 10 MG/ML
5 INJECTION, SOLUTION INTRAMUSCULAR; INTRAVENOUS; SUBCUTANEOUS
Status: DISCONTINUED | OUTPATIENT
Start: 2023-10-13 | End: 2023-10-14

## 2023-10-13 RX ORDER — DIPHENHYDRAMINE HCL 25 MG
25 CAPSULE ORAL EVERY 4 HOURS PRN
Status: DISCONTINUED | OUTPATIENT
Start: 2023-10-13 | End: 2023-10-14 | Stop reason: HOSPADM

## 2023-10-13 RX ORDER — POLYETHYLENE GLYCOL 3350 17 G/17G
17 POWDER, FOR SOLUTION ORAL 2 TIMES DAILY PRN
Status: DISCONTINUED | OUTPATIENT
Start: 2023-10-13 | End: 2023-10-14 | Stop reason: HOSPADM

## 2023-10-13 RX ORDER — ACETAMINOPHEN 325 MG/1
975 TABLET ORAL EVERY 6 HOURS SCHEDULED
Status: DISCONTINUED | OUTPATIENT
Start: 2023-10-13 | End: 2023-10-14 | Stop reason: HOSPADM

## 2023-10-13 RX ORDER — DEXTROSE 40 %
15 GEL (GRAM) ORAL
Status: DISCONTINUED | OUTPATIENT
Start: 2023-10-13 | End: 2023-10-14 | Stop reason: HOSPADM

## 2023-10-13 RX ORDER — CARBOPROST TROMETHAMINE 250 UG/ML
250 INJECTION, SOLUTION INTRAMUSCULAR ONCE AS NEEDED
Status: DISCONTINUED | OUTPATIENT
Start: 2023-10-13 | End: 2023-10-14 | Stop reason: HOSPADM

## 2023-10-13 RX ORDER — LOPERAMIDE HYDROCHLORIDE 2 MG/1
4 CAPSULE ORAL EVERY 2 HOUR PRN
Status: DISCONTINUED | OUTPATIENT
Start: 2023-10-13 | End: 2023-10-14 | Stop reason: HOSPADM

## 2023-10-13 RX ORDER — AMOXICILLIN 250 MG
2 CAPSULE ORAL NIGHTLY PRN
Status: DISCONTINUED | OUTPATIENT
Start: 2023-10-13 | End: 2023-10-14 | Stop reason: HOSPADM

## 2023-10-13 RX ORDER — HYDRALAZINE HYDROCHLORIDE 20 MG/ML
5 INJECTION INTRAMUSCULAR; INTRAVENOUS ONCE AS NEEDED
Status: COMPLETED | OUTPATIENT
Start: 2023-10-13 | End: 2023-10-13

## 2023-10-13 RX ORDER — MISOPROSTOL 200 UG/1
800 TABLET ORAL ONCE AS NEEDED
Status: DISCONTINUED | OUTPATIENT
Start: 2023-10-13 | End: 2023-10-14 | Stop reason: HOSPADM

## 2023-10-13 RX ORDER — ADHESIVE BANDAGE
10 BANDAGE TOPICAL
Status: DISCONTINUED | OUTPATIENT
Start: 2023-10-13 | End: 2023-10-14 | Stop reason: HOSPADM

## 2023-10-13 RX ADMIN — IBUPROFEN 600 MG: 600 TABLET, FILM COATED ORAL at 18:11

## 2023-10-13 RX ADMIN — FUROSEMIDE 20 MG: 10 INJECTION, SOLUTION INTRAVENOUS at 16:03

## 2023-10-13 RX ADMIN — HYDRALAZINE HYDROCHLORIDE 10 MG: 20 INJECTION, SOLUTION INTRAMUSCULAR; INTRAVENOUS at 16:02

## 2023-10-13 RX ADMIN — HYDRALAZINE HYDROCHLORIDE 5 MG: 20 INJECTION INTRAMUSCULAR; INTRAVENOUS at 14:36

## 2023-10-13 RX ADMIN — NIFEDIPINE 30 MG: 30 TABLET, FILM COATED, EXTENDED RELEASE ORAL at 14:54

## 2023-10-13 RX ADMIN — ACETAMINOPHEN 975 MG: 325 TABLET ORAL at 18:12

## 2023-10-13 SDOH — HEALTH STABILITY: MENTAL HEALTH: SUICIDAL BEHAVIOR (LIFETIME): NO

## 2023-10-13 SDOH — SOCIAL STABILITY: SOCIAL INSECURITY: PHYSICAL ABUSE: DENIES

## 2023-10-13 SDOH — ECONOMIC STABILITY: HOUSING INSECURITY: DO YOU FEEL UNSAFE GOING BACK TO THE PLACE WHERE YOU ARE LIVING?: NO

## 2023-10-13 SDOH — SOCIAL STABILITY: SOCIAL INSECURITY: DO YOU FEEL ANYONE HAS EXPLOITED OR TAKEN ADVANTAGE OF YOU FINANCIALLY OR OF YOUR PERSONAL PROPERTY?: NO

## 2023-10-13 SDOH — SOCIAL STABILITY: SOCIAL INSECURITY: ABUSE SCREEN: ADULT

## 2023-10-13 SDOH — SOCIAL STABILITY: SOCIAL INSECURITY: HAS ANYONE EVER THREATENED TO HURT YOUR FAMILY OR YOUR PETS?: NO

## 2023-10-13 SDOH — SOCIAL STABILITY: SOCIAL INSECURITY: HAVE YOU HAD THOUGHTS OF HARMING ANYONE ELSE?: NO

## 2023-10-13 SDOH — HEALTH STABILITY: MENTAL HEALTH: HAVE YOU USED ANY SUBSTANCES (CANABIS, COCAINE, HEROIN, HALLUCINOGENS, INHALANTS, ETC.) IN THE PAST 12 MONTHS?: NO

## 2023-10-13 SDOH — SOCIAL STABILITY: SOCIAL INSECURITY: VERBAL ABUSE: DENIES

## 2023-10-13 SDOH — SOCIAL STABILITY: SOCIAL INSECURITY: ARE YOU OR HAVE YOU BEEN THREATENED OR ABUSED PHYSICALLY, EMOTIONALLY, OR SEXUALLY BY ANYONE?: NO

## 2023-10-13 SDOH — SOCIAL STABILITY: SOCIAL INSECURITY: ARE THERE ANY APPARENT SIGNS OF INJURIES/BEHAVIORS THAT COULD BE RELATED TO ABUSE/NEGLECT?: NO

## 2023-10-13 SDOH — HEALTH STABILITY: MENTAL HEALTH: WISH TO BE DEAD (PAST 1 MONTH): NO

## 2023-10-13 SDOH — SOCIAL STABILITY: SOCIAL INSECURITY: DOES ANYONE TRY TO KEEP YOU FROM HAVING/CONTACTING OTHER FRIENDS OR DOING THINGS OUTSIDE YOUR HOME?: NO

## 2023-10-13 SDOH — HEALTH STABILITY: MENTAL HEALTH: NON-SPECIFIC ACTIVE SUICIDAL THOUGHTS (PAST 1 MONTH): NO

## 2023-10-13 SDOH — HEALTH STABILITY: MENTAL HEALTH: HAVE YOU USED ANY PRESCRIPTION DRUGS OTHER THAN PRESCRIBED IN THE PAST 12 MONTHS?: NO

## 2023-10-13 ASSESSMENT — PAIN SCALES - GENERAL
PAINLEVEL_OUTOF10: 0 - NO PAIN
PAINLEVEL: 0 - NO PAIN
PAINLEVEL_OUTOF10: 0 - NO PAIN

## 2023-10-13 ASSESSMENT — PATIENT HEALTH QUESTIONNAIRE - PHQ9
1. LITTLE INTEREST OR PLEASURE IN DOING THINGS: NOT AT ALL
SUM OF ALL RESPONSES TO PHQ9 QUESTIONS 1 & 2: 0
2. FEELING DOWN, DEPRESSED OR HOPELESS: NOT AT ALL

## 2023-10-13 ASSESSMENT — LIFESTYLE VARIABLES
HOW OFTEN DO YOU HAVE 6 OR MORE DRINKS ON ONE OCCASION: NEVER
AUDIT-C TOTAL SCORE: 0
HOW OFTEN DO YOU HAVE A DRINK CONTAINING ALCOHOL: NEVER
HOW MANY STANDARD DRINKS CONTAINING ALCOHOL DO YOU HAVE ON A TYPICAL DAY: PATIENT DOES NOT DRINK
AUDIT-C TOTAL SCORE: 0
SKIP TO QUESTIONS 9-10: 1

## 2023-10-13 ASSESSMENT — VISUAL ACUITY: OU: 1

## 2023-10-13 NOTE — CONSULTS
Obstetrical Consult    Reason for Consult: T1DM, postpartum sPEC     Assessment/Plan    Post op Care  - pLTCS 10/9, arrest of descent, L extension, EBL 3L, L josé, B-Rollins, hemabate x2, intrauterine pit, buccal cyto, Yaneli s/p removal, total 3u pRBCs + 1 FFP  - Continue scheduled PO post op meds  - insulin pump as below  - Regular diet  - meeting postop milestones     gHTN >sPEC  - dx by severe ranges greater than 4 hours apart  - Hydral 5/10  - HELLP Labs neg, P:C 0.18 last admission, follow up CBC, CMP  - Asymptomatic  - Lower extremity swelling with SOB, BNP and CXR ordered, follow up results, IV lasix 20 administered  - Defer Mg gtt given no Neuro symptoms     T1DM  - Currently on Insulin Pump, BG wnl  - Plan for future pump tandem/dexcom suing control IQ:   Postpartum:   - fasting preprandials with self ISS  - Current settings:  Basal                     Carb Ratio         CF     0946-2011 0.80          1:11            1:35  2721-5707 0.85          1:11            1:35   4832-1849 0.65          1:7              1:35  2947-7079 0.60          1:6              1:35  6217-1541 0.60          1:7              1:35     Target 130  Insulin DOA 5 hrs  Max bolus 25     PPBC  - POPs, continue on admission       Subjective   Patient doing well with no HA, no vision changes, no RUQ pain.  She endorses SOB and LE swelling.       Obstetrical History   OB History    Para Term  AB Living   1 1 1     1   SAB IAB Ectopic Multiple Live Births         0 1      # Outcome Date GA Lbr Naveed/2nd Weight Sex Delivery Anes PTL Lv   1 Term 10/09/23 37w2d / 03:23 4190 g M CS-LTranv EPI  LB      Complications: Failure to Progress in Second Stage, Hemorrhage       Past Medical History  Past Medical History:   Diagnosis Date    Cataract due to DM (CMS/HCC)     Depression affecting pregnancy     Emmetropia     Type 1 diabetes mellitus affecting pregnancy in third trimester, antepartum         Past Surgical History   No past  surgical history on file.    Social History  Social History     Tobacco Use    Smoking status: Never    Smokeless tobacco: Never   Substance Use Topics    Alcohol use: Not Currently     Substance and Sexual Activity   Drug Use Not Currently       Allergies  Patient has no known allergies.     Medications  Medications Prior to Admission   Medication Sig Dispense Refill Last Dose    acetaminophen (Tylenol) 325 mg tablet Take 3 tablets (975 mg) by mouth every 6 hours if needed for mild pain (1 - 3). 90 tablet 1 10/13/2023 at 1200    ibuprofen 600 mg tablet Take 1 tablet (600 mg) by mouth every 6 hours. 120 tablet 1 10/13/2023 at 1200    acetone, urine, test (Ketostix) strip use as directed when blood sugar is over 250 or when ill       ammonium lactate (Amlactin) 12 % cream 1 Application       benzoyl peroxide (Brevoxyl) 4 % external liquid 1 Application       blood sugar diagnostic (Contour Next Test Strips) strip test bg 8-9 times per day       Dexcom G4 platinum transmitter (Dexcom G6 Transmitter) device Use one every 90 days 1 each 3     drospirenone, contraceptive, 4 mg (28) tablet Take 1 tablet by mouth once daily. 30 tablet 2     glucagon (Glucagen) 1 mg injection inject 1 mg as directed for severe hypoglycemia       oxyCODONE (Roxicodone) 5 mg immediate release tablet Take 1 tablet (5 mg) by mouth every 6 hours if needed for moderate pain (4 - 6) or severe pain (7 - 10). 10 tablet 0     triamcinolone (Kenalog) 0.1 % cream 1 Application          Objective    Last Vitals  Temp Pulse Resp BP MAP O2 Sat   37.1 °C (98.8 °F) 63 18 (!) 173/83   97 %     Physical Examination  GENERAL: Examination reveals a well developed, well nourished, gravid female in no acute distress. She is alert and cooperative.  NECK: supple, no significant adenopathy  LUNGS: clear to auscultation bilaterally  HEART: regular rate and rhythm, S1, S2 normal, no murmur, click, rub or gallop  ABDOMEN: Pfannenstiel scar present  EXTREMITIES: no  redness or tenderness in the calves or thighs, no edema, edema 2+  SKIN: normal coloration and turgor, no rashes  NEUROLOGICAL: DTRs normal and symmetrical  PSYCHOLOGICAL: awake and alert; oriented to person, place, and time  Incision CDI  Lab Review  Labs in chart were reviewed.]

## 2023-10-13 NOTE — TELEPHONE ENCOUNTER
Patient called the office with elevated post partum blood pressure. States she first BP was 170/100 something and repeat was 164/91, reports swelling and feeling unwell. Discussed with Dr. Haas, would like the patient to go to triage at [facility] to be evaluated for elevated BP. All information given to the patient and she is going now.

## 2023-10-13 NOTE — H&P
Postpartum Progress Note    Assessment/Plan   Becca Irene is a 28 y.o., G1 now  POD#4 from [facility] at 37wga for arrest of descent c/b PPH of 3L, admitted to     Post op Care  - pLT 10/9, arrest of descent, L extension, EBL 3L, L josé, B-Rollins, hemabate x2, intrauterine pit, buccal cyto, Yaneli s/p removal, received 1upRBC and 1uFFP inpatient last admission  - Continue scheduled PO post op meds  - insulin pump as below  - Regular diet  - meeting postop milestones    gHTN >sPEC  - dx by severe ranges greater than 4 hours apart  - HELLP Labs neg, P:C 0.18 last admission, repeat CBC, CMP collected  - Asymptomatic  - Lower extremity swelling with SOB: BNP and CXR ordered, follow up resutls  - Defer Mg gtt given no Neuro symptoms     T1DM  - Currently on Insulin Pump, BG wnl  - Plan for future pump tandem/dexcom suing control IQ:   Postpartum:   - Continue control IQ  - Current settings:  Basal                     Carb Ratio         CF     [id number] 0.80          1:11            1:35  7838-2441 0.85          1:11            1:35   9093-7956 0.65          1:7              1:35  4229-1167 0.60          1:6              1:35  2989-2982 0.60          1:7              1:35     Target 130  Insulin DOA 5 hrs  Max bolus 25    PPBC  - POPs, continue on admission    Dispo  - Admit to L&D for BP monitoring, to MFM service for postpartum readmit in the s/o known T1DM.    Shay Blue MD  Seen and discussed with Dr. Flavio Blue MD  PGY-3    Active Problems:    Type 1 diabetes mellitus (CMS/HCC)    Anxiety    Gastroesophageal reflux disease    Depression  Severe preeclampsia       Patient Active Problem List   Diagnosis    Type 1 diabetes mellitus (CMS/HCC)    Dry eye syndrome of both lacrimal glands    Cataract due to DM (CMS/HCC)    Anxiety    Type 1 diabetes mellitus affecting pregnancy in third trimester, antepartum    Gestational hypertension, third trimester    Gastroesophageal reflux disease     Depression    Status post primary low transverse  section    Pre-eclampsia, severe        Subjective   Patient POD#4 s/p pCS for AOD in the s/o TN now presenting with severe range BP's at home. Patient denies HA, RUQ pain or blurry vision but notes some persistent LES since delivery and ongoing SOB that worsens when she is lying flat. Denies associated CP. No dizziness or chills. From a postop/postpartum standpoint doing well. Pain welll controlled with PO pain medication. Voiding well, and tolerating diet. Patient continuing T1DM management with insulin pump, denies symptomatic low BG since delivery.  Objective   Allergies:   Patient has no known allergies.         Last Vitals:  Temp Pulse Resp BP MAP Pulse Ox   37.1 °C (98.8 °F) 63 18 (!) 170/83   97 %     Vitals Min/Max Last 24 Hours:  Temp  Min: 37.1 °C (98.8 °F)  Max: 37.1 °C (98.8 °F)  Pulse  Min: 56  Max: 72  Resp  Min: 18  Max: 18  BP  Min: 158/93  Max: 170/83    Intake/Output:   No intake or output data in the 24 hours ending 10/13/23 1507      Physical Exam:  Physical Exam  Constitutional:       Appearance: Normal appearance. She is well-developed and well-groomed.   HENT:      Head: Normocephalic and atraumatic.      Nose: Nose normal.      Mouth/Throat:      Mouth: Mucous membranes are moist.      Pharynx: Oropharynx is clear.   Eyes:      General: Lids are normal. Vision grossly intact.      Extraocular Movements: Extraocular movements intact.      Conjunctiva/sclera: Conjunctivae normal.      Pupils: Pupils are equal, round, and reactive to light.   Pulmonary:      Effort: Pulmonary effort is normal.      Breath sounds: Normal air entry.   Abdominal:      General: There is no distension.      Palpations: Abdomen is soft. There is no mass.      Tenderness: There is no abdominal tenderness. There is no right CVA tenderness.      Hernia: No hernia is present. There is no hernia in the right inguinal area.      Comments:  incision  "c/d/i   Musculoskeletal:         General: Swelling present. Normal range of motion.      Comments: 2+ edema in the bilater LLE, no calf tenderness or discoloration.   Lymphadenopathy:      Lower Body: No right inguinal adenopathy.   Skin:     General: Skin is warm.      Findings: No abrasion or bruising.   Neurological:      Mental Status: She is alert and oriented to person, place, and time. Mental status is at baseline.   Psychiatric:         Mood and Affect: Mood normal.         Behavior: Behavior normal.         Thought Content: Thought content normal.         Judgment: Judgment normal.                     Lab Data:  No results found for: \"ABO\", \"LABRH\", \"ABSCRN\"    No results found for: \"WBC\", \"HGB\", \"HCT\", \"PLT\"    "

## 2023-10-14 ENCOUNTER — APPOINTMENT (OUTPATIENT)
Dept: CARDIOLOGY | Facility: HOSPITAL | Age: 28
End: 2023-10-14
Payer: MEDICAID

## 2023-10-14 VITALS
RESPIRATION RATE: 16 BRPM | BODY MASS INDEX: 35.94 KG/M2 | HEIGHT: 66 IN | SYSTOLIC BLOOD PRESSURE: 132 MMHG | HEART RATE: 69 BPM | OXYGEN SATURATION: 97 % | DIASTOLIC BLOOD PRESSURE: 84 MMHG | TEMPERATURE: 99.1 F

## 2023-10-14 LAB
EJECTION FRACTION APICAL 4 CHAMBER: 57.6
ERYTHROCYTE [DISTWIDTH] IN BLOOD BY AUTOMATED COUNT: 14.1 % (ref 11.5–14.5)
GLUCOSE BLD MANUAL STRIP-MCNC: 80 MG/DL (ref 74–99)
HCT VFR BLD AUTO: 29.4 % (ref 36–46)
HGB BLD-MCNC: 9.5 G/DL (ref 12–16)
MCH RBC QN AUTO: 30.6 PG (ref 26–34)
MCHC RBC AUTO-ENTMCNC: 32.3 G/DL (ref 32–36)
MCV RBC AUTO: 95 FL (ref 80–100)
NRBC BLD-RTO: 0 /100 WBCS (ref 0–0)
PLATELET # BLD AUTO: 324 X10*3/UL (ref 150–450)
PMV BLD AUTO: 9.8 FL (ref 7.5–11.5)
RBC # BLD AUTO: 3.1 X10*6/UL (ref 4–5.2)
WBC # BLD AUTO: 9.3 X10*3/UL (ref 4.4–11.3)

## 2023-10-14 PROCEDURE — 2500000001 HC RX 250 WO HCPCS SELF ADMINISTERED DRUGS (ALT 637 FOR MEDICARE OP): Performed by: STUDENT IN AN ORGANIZED HEALTH CARE EDUCATION/TRAINING PROGRAM

## 2023-10-14 PROCEDURE — 82947 ASSAY GLUCOSE BLOOD QUANT: CPT

## 2023-10-14 PROCEDURE — 85027 COMPLETE CBC AUTOMATED: CPT | Mod: CMCLAB | Performed by: STUDENT IN AN ORGANIZED HEALTH CARE EDUCATION/TRAINING PROGRAM

## 2023-10-14 PROCEDURE — 36415 COLL VENOUS BLD VENIPUNCTURE: CPT | Performed by: STUDENT IN AN ORGANIZED HEALTH CARE EDUCATION/TRAINING PROGRAM

## 2023-10-14 PROCEDURE — 36415 COLL VENOUS BLD VENIPUNCTURE: CPT | Mod: CMCLAB | Performed by: STUDENT IN AN ORGANIZED HEALTH CARE EDUCATION/TRAINING PROGRAM

## 2023-10-14 PROCEDURE — 93306 TTE W/DOPPLER COMPLETE: CPT

## 2023-10-14 PROCEDURE — 93306 TTE W/DOPPLER COMPLETE: CPT | Performed by: INTERNAL MEDICINE

## 2023-10-14 PROCEDURE — 99238 HOSP IP/OBS DSCHRG MGMT 30/<: CPT | Performed by: STUDENT IN AN ORGANIZED HEALTH CARE EDUCATION/TRAINING PROGRAM

## 2023-10-14 PROCEDURE — 2500000004 HC RX 250 GENERAL PHARMACY W/ HCPCS (ALT 636 FOR OP/ED): Performed by: STUDENT IN AN ORGANIZED HEALTH CARE EDUCATION/TRAINING PROGRAM

## 2023-10-14 RX ORDER — CALCIUM CARBONATE 500(1250)
625 TABLET ORAL ONCE
Status: DISCONTINUED | OUTPATIENT
Start: 2023-10-14 | End: 2023-10-14 | Stop reason: HOSPADM

## 2023-10-14 RX ORDER — NIFEDIPINE 30 MG/1
30 TABLET, FILM COATED, EXTENDED RELEASE ORAL
Qty: 30 TABLET | Refills: 3 | Status: SHIPPED | OUTPATIENT
Start: 2023-10-15 | End: 2024-01-18 | Stop reason: ALTCHOICE

## 2023-10-14 RX ADMIN — IBUPROFEN 600 MG: 600 TABLET, FILM COATED ORAL at 13:02

## 2023-10-14 RX ADMIN — ACETAMINOPHEN 975 MG: 325 TABLET ORAL at 13:02

## 2023-10-14 RX ADMIN — IBUPROFEN 600 MG: 600 TABLET, FILM COATED ORAL at 06:57

## 2023-10-14 RX ADMIN — NIFEDIPINE 30 MG: 30 TABLET, FILM COATED, EXTENDED RELEASE ORAL at 06:58

## 2023-10-14 RX ADMIN — IBUPROFEN 600 MG: 600 TABLET, FILM COATED ORAL at 00:07

## 2023-10-14 RX ADMIN — ACETAMINOPHEN 975 MG: 325 TABLET ORAL at 00:06

## 2023-10-14 RX ADMIN — ACETAMINOPHEN 975 MG: 325 TABLET ORAL at 06:58

## 2023-10-14 ASSESSMENT — PAIN SCALES - GENERAL
PAINLEVEL_OUTOF10: 0 - NO PAIN
PAINLEVEL_OUTOF10: 2
PAINLEVEL_OUTOF10: 0 - NO PAIN

## 2023-10-14 NOTE — CARE PLAN
Problem: Meds/Post-op Pain  Goal: Pain controlled to tolerate pain level  Outcome: Met     Problem: Wound care/infection prevention  Goal: No signs of infection in 24 hrs.  Outcome: Met  Goal: No unexpected bleeding from incision this shift  Outcome: Met     Problem: Hypertensive Disorder of Pregnancy (HDP)  Goal: Minimal s/sx of HDP and BP<160/110  Outcome: Met  Goal: Adequate urine output (0.5 ml/kg/hr)  Outcome: Met     Problem: Safety - Adult  Goal: Free from fall injury  Outcome: Met     Problem: Diabetes  Goal: Increase stability of blood glucose readings by end of shift  Outcome: Met  Goal: Maintain glucose levels >70mg/dl to <250mg/dl throughout shift  Outcome: Met  Goal: No changes in neurological exam by end of shift  Outcome: Met  Goal: Vital signs within normal range for age by end of shift  Outcome: Met

## 2023-10-14 NOTE — DISCHARGE INSTRUCTIONS

## 2023-10-14 NOTE — PROGRESS NOTES
Postpartum Progress Note  Assessment/Plan   Becca Irene is a 28 y.o., , who delivered at 37w2d gestation and is now postpartum day 5.    Post op Care  - pLTCS 10/9, arrest of descent, L extension, EBL 3L, L josé, B-Rollins, hemabate x2, intrauterine pit, buccal cyto, Yaneli s/p removal, total 3u pRBCs + 1 FFP  - Continue scheduled PO post op meds  - insulin pump as below  - Regular diet  - meeting postop milestones     gHTN >sPEC  - dx by severe ranges greater than 4 hours apart  - Hydral 5/10  - HELLP Labs neg, P:C 0.18 last admission, follow up CBC, CMP  - Asymptomatic  - CXR negative   -    - s/p IV lasix 20mg   - Defer Mg gtt given no Neuro symptoms  - Continue nifed 30     T1DM  - Currently on Insulin Pump, BG wnl  - Plan for future pump tandem/dexcom suing control IQ:   Postpartum:   - fasting preprandials with self ISS  - Current settings:  Basal                     Carb Ratio         CF     [id number] 0.80          1:11            1:35  3351-5255 0.85          1:11            1:35   9944-3797 0.65          1:7              1:35  7548-7855 0.60          1:6              1:35  [id number] 0.60          1:7              1:35     Target 130  Insulin DOA 5 hrs  Max bolus 25     PPBC  - POPs, continue on admission    Seen and discussed with Dr. Prior Haylee Montilla MD     Subjective   Feeling well after delivery. Pain well-controlled with medication. Denies chest pain or shortness of breath. Swelling improved significantly after IV Lasix. Endorsing tingling in extremities and face.     Objective   Allergies:   Patient has no known allergies.         Last Vitals:  Temp Pulse Resp BP MAP Pulse Ox   36.7 °C (98.1 °F) 70 18 121/72   94 %     Vitals Min/Max Last 24 Hours:  Temp  Min: 36.5 °C (97.7 °F)  Max: 37.1 °C (98.8 °F)  Pulse  Min: 56  Max: 122  Resp  Min: 16  Max: 18  BP  Min: 114/74  Max: 173/83    Intake/Output:     Intake/Output Summary (Last 24 hours) at 10/14/2023 0113  Last data filed at  10/13/2023 2211  Gross per 24 hour   Intake --   Output 1100 ml   Net -1100 ml       Physical Exam:  General: Examination reveals a well developed, well nourished, female, in no acute distress. She is alert and cooperative.  HEENT: PERRLA. External ears normal. Nose normal, no erythema or discharge. Mouth and throat clear.  Neck: supple, no significant adenopathy.  Lungs: clear to auscultation bilaterally.  Cardiac: regular rate.  Extremities: edema 1+.  Neurological: grossly intact bilaterally.  Psychological: appropriate affect.    Lab Data:  Lab Results   Component Value Date    WBC 11.5 (H) 10/13/2023    HGB 9.7 (L) 10/13/2023    HCT 29.5 (L) 10/13/2023     10/13/2023     Lab Results   Component Value Date    GLUCOSE 119 (H) 10/13/2023     10/13/2023    K 3.8 10/13/2023     (H) 10/13/2023    CO2 21 10/13/2023    ANIONGAP 15 10/13/2023    BUN 9 10/13/2023    CREATININE 0.64 10/13/2023    EGFR >90 10/13/2023    CALCIUM 8.2 (L) 10/13/2023    ALBUMIN 3.1 (L) 10/13/2023    PROT 6.1 (L) 10/13/2023    ALKPHOS 131 (H) 10/13/2023    ALT 14 10/13/2023    AST 24 10/13/2023    BILITOT 0.6 10/13/2023     Lab Results   Component Value Date     (H) 10/13/2023

## 2023-10-14 NOTE — PROGRESS NOTES
Postpartum Progress Note  Assessment/Plan   Becca Irene is a 28 y.o.,  s/p pLTCS 10/    sPEC  - dx by severe ranges greater than 4 hours apart  - Hydral 5/10  - HELLP labs neg x1, follow up repeat labs this AM  - Asymptomatic  - CXR negative   - . s/p IV lasix 20mg. Bedside echo wnl per cards fellow, formal pending this AM.  - Defer Mg gtt given no Neuro symptoms  - Continue nifed 30     T1DM  - Currently on Insulin Pump, BG wnl  - fasting preprandials with self ISS  - Current settings:  Basal                     Carb Ratio         CF     [id number] 0.80          1:11            1:35  6286-5982 0.85          1:11            1:35   4660-8463 0.65          1:7              1:35  9077-3339 0.60          1:6              1:35  5720-1849 0.60          1:7              1:35     Target 130  Insulin DOA 5 hrs  Max bolus 25    Post op Care  - pLTCS 10/, arrest of descent, L extension, EBL 3L, L josé, B-Rollins, hemabate x2, intrauterine pit, buccal cyto, Yaneli s/p removal, total 3u pRBCs + 1 FFP  - Continue scheduled PO post op meds  - Regular diet  - Meeting postop milestones  - PPBC: POPs, continue    D/w Dr. Joaquim Farrar MD  PGY-3, Obstetrics and Gynecology  [facility] Pager: [phone]    Subjective   Feels well this AM. No SOB/CP. No HA, vision changes, RUQ pain, LE edema     Objective   Allergies:   Patient has no known allergies.         Last Vitals:  Temp Pulse Resp BP MAP Pulse Ox   36.7 °C (98.1 °F) 67 16 124/78 88 mmHg 96 %     Vitals Min/Max Last 24 Hours:  Temp  Min: 36.5 °C (97.7 °F)  Max: 37.1 °C (98.8 °F)  Pulse  Min: 56  Max: 122  Resp  Min: 16  Max: 18  BP  Min: 114/74  Max: 173/83  MAP  Min: 88 mmHg  Max: 88 mmHg    Intake/Output:     Intake/Output Summary (Last 24 hours) at 10/14/2023 0953  Last data filed at 10/14/2023 0700  Gross per 24 hour   Intake 250 ml   Output 2450 ml   Net -2200 ml         Physical Exam:  Physical Exam  Constitutional:       General: She is not in acute  distress.     Appearance: Normal appearance.   HENT:      Head: Normocephalic and atraumatic.      Right Ear: External ear normal.      Left Ear: External ear normal.      Nose: Nose normal.      Mouth/Throat:      Mouth: Mucous membranes are moist.      Pharynx: Oropharynx is clear.   Eyes:      Extraocular Movements: Extraocular movements intact.      Conjunctiva/sclera: Conjunctivae normal.   Pulmonary:      Effort: Pulmonary effort is normal.   Abdominal:      Palpations: Abdomen is soft.      Tenderness: There is no abdominal tenderness.      Comments: Incision c/d/I   Musculoskeletal:         General: Normal range of motion.      Cervical back: Normal range of motion.   Skin:     General: Skin is warm and dry.      Findings: No bruising or rash.   Neurological:      General: No focal deficit present.      Mental Status: She is alert.   Psychiatric:         Mood and Affect: Mood normal.         Behavior: Behavior normal.           Lab Data:  Lab Results   Component Value Date    WBC 9.3 10/14/2023    HGB 9.5 (L) 10/14/2023    HCT 29.4 (L) 10/14/2023     10/14/2023     Lab Results   Component Value Date    GLUCOSE 119 (H) 10/13/2023     10/13/2023    K 3.8 10/13/2023     (H) 10/13/2023    CO2 21 10/13/2023    ANIONGAP 15 10/13/2023    BUN 9 10/13/2023    CREATININE 0.64 10/13/2023    EGFR >90 10/13/2023    CALCIUM 8.2 (L) 10/13/2023    ALBUMIN 3.1 (L) 10/13/2023    PROT 6.1 (L) 10/13/2023    ALKPHOS 131 (H) 10/13/2023    ALT 14 10/13/2023    AST 24 10/13/2023    BILITOT 0.6 10/13/2023     Lab Results   Component Value Date     (H) 10/13/2023

## 2023-10-14 NOTE — DISCHARGE SUMMARY
Discharge Summary    Admission Date: 10/13/2023  Discharge Date: 10/14/23    Discharge Diagnosis  Severe preeclampsia    Hospital Course  Pt is a 29 yo  s/p PLTCS on 10/9; admitted for sPEC. Blood pressures normalized after starting nifed 30 daily, will continue on discharge. Did have some SOB, BNP elevated but echo and CXR wnl. Magnesium held due to no neuro symptoms. Now >24hrs without severe BP, stable for DC. Discussed return precautions including headaches, visual changes, RUQ pain and Bps >160/110. FUV scheduled 10/27.    Discharge Meds     Your medication list        START taking these medications        Instructions Last Dose Given Next Dose Due   NIFEdipine ER 30 mg 24 hr tablet  Commonly known as: Adalat CC  Start taking on: October 15, 2023      Take 1 tablet (30 mg) by mouth once daily in the morning. Take before meals. Do not crush, chew, or split. Do not start before October 15, 2023.              CONTINUE taking these medications        Instructions Last Dose Given Next Dose Due   acetaminophen 325 mg tablet  Commonly known as: Tylenol      Take 3 tablets (975 mg) by mouth every 6 hours if needed for mild pain (1 - 3).       ammonium lactate 12 % cream  Commonly known as: Amlactin           benzoyl peroxide 4 % external liquid  Commonly known as: Brevoxyl           Contour Next Test Strips strip  Generic drug: blood sugar diagnostic           Dexcom G6 Transmitter device  Generic drug: Dexcom G4 platinum transmitter      Use one every 90 days       drospirenone (contraceptive) 4 mg (28) tablet      Take 1 tablet by mouth once daily.       glucagon 1 mg injection  Commonly known as: Glucagen           ibuprofen 600 mg tablet      Take 1 tablet (600 mg) by mouth every 6 hours.       Ketostix strip  Generic drug: acetone (urine) test           oxyCODONE 5 mg immediate release tablet  Commonly known as: Roxicodone      Take 1 tablet (5 mg) by mouth every 6 hours if needed for moderate pain (4 - 6) or  severe pain (7 - 10).       triamcinolone 0.1 % cream  Commonly known as: Kenalog                     Where to Get Your Medications        These medications were sent to [id number] - [place], [place] - [address] [facility]  [id number] [place] [id number]      Phone: [phone]   NIFEdipine ER 30 mg 24 hr tablet          Test Results Pending At Discharge  Pending Labs       No current pending labs.            Outpatient Follow-Up  Future Appointments   Date Time Provider Department Center   10/27/2023 11:00 AM MARLEN Rothman-CNP [id number] [facility]

## 2023-10-17 NOTE — SIGNIFICANT EVENT
Follow-up Phone Call Note:   Interview:  Care Type: Women's Health   Phone Number Call  [phone]   Call Outcome: no answer   Date/Time Of Call: 10/17/2023 at 1254   Call Back Done By: care coordinator   Callback Complete: yes

## 2023-10-27 ENCOUNTER — OFFICE VISIT (OUTPATIENT)
Dept: MATERNAL FETAL MEDICINE | Facility: CLINIC | Age: 28
End: 2023-10-27
Payer: MEDICAID

## 2023-10-27 VITALS
SYSTOLIC BLOOD PRESSURE: 107 MMHG | WEIGHT: 179 LBS | HEIGHT: 66 IN | HEART RATE: 84 BPM | DIASTOLIC BLOOD PRESSURE: 74 MMHG | BODY MASS INDEX: 28.77 KG/M2

## 2023-10-27 DIAGNOSIS — Z30.011 ENCOUNTER FOR INITIAL PRESCRIPTION OF CONTRACEPTIVE PILLS: Primary | ICD-10-CM

## 2023-10-27 PROBLEM — O24.013: Status: RESOLVED | Noted: 2023-10-02 | Resolved: 2023-10-27

## 2023-10-27 PROBLEM — O13.3 GESTATIONAL HYPERTENSION, THIRD TRIMESTER (HHS-HCC): Status: RESOLVED | Noted: 2023-10-06 | Resolved: 2023-10-27

## 2023-10-27 PROCEDURE — 3074F SYST BP LT 130 MM HG: CPT | Performed by: STUDENT IN AN ORGANIZED HEALTH CARE EDUCATION/TRAINING PROGRAM

## 2023-10-27 PROCEDURE — 99213 OFFICE O/P EST LOW 20 MIN: CPT | Mod: TH | Performed by: STUDENT IN AN ORGANIZED HEALTH CARE EDUCATION/TRAINING PROGRAM

## 2023-10-27 PROCEDURE — 3061F NEG MICROALBUMINURIA REV: CPT | Performed by: STUDENT IN AN ORGANIZED HEALTH CARE EDUCATION/TRAINING PROGRAM

## 2023-10-27 PROCEDURE — 1036F TOBACCO NON-USER: CPT | Performed by: STUDENT IN AN ORGANIZED HEALTH CARE EDUCATION/TRAINING PROGRAM

## 2023-10-27 PROCEDURE — 3044F HG A1C LEVEL LT 7.0%: CPT | Performed by: STUDENT IN AN ORGANIZED HEALTH CARE EDUCATION/TRAINING PROGRAM

## 2023-10-27 PROCEDURE — 99213 OFFICE O/P EST LOW 20 MIN: CPT | Performed by: STUDENT IN AN ORGANIZED HEALTH CARE EDUCATION/TRAINING PROGRAM

## 2023-10-27 PROCEDURE — 3078F DIAST BP <80 MM HG: CPT | Performed by: STUDENT IN AN ORGANIZED HEALTH CARE EDUCATION/TRAINING PROGRAM

## 2023-10-27 NOTE — PROGRESS NOTES
"MFM Postpartum Follow-up  10/27/2023         SUBJECTIVE    HPI: Becca Irene is a 28 y.o.  s/p pLTCS on 10/9/2023 c/b readmission on 10/13/2023 for management of severe preeclampsia. Patient reports she had a difficult recovery immediatley postpartum but is now feeling much better. No longer requiring pain meds. Able to ambulate, void and have bowel movements without any issues. She has good support at home and mood has been stable. Reports taking Bps at home and they have been normotensive. Occasiaonlly, does get lightheaded when moving from sitting to standing position. Minimal lochia and no abnormal vaginal discharge.    OBJECTIVE    Visit Vitals  /74   Pulse 84   Ht 1.676 m (5' 6\")   Wt 81.2 kg (179 lb)   LMP  (LMP Unknown) Comment: Postpartum   Breastfeeding No   BMI 28.89 kg/m²   OB Status Recent pregnancy   Smoking Status Never   BSA 1.94 m²      Incision: low transverse incision clean, dry and intact without any signs of infection    ASSESSMENT & PLAN    Becca Irene is a 28 y.o.  s/p pLTCS on 10/9  here for the following concerns we addressed today:    #) Preeclampsia with severe features  - Readmission for sPEC on 10/13 and initiated on Nifedipine 30mg daily   - BNP elevated at time of admission but reassuring echo and CXR  - BP range: Normotensive at home and /74 in clinic today  - Will discontinue Nifedipine     #) Post-op  - Surgery complicated by L extension requiring [place] and atony requiring B-orourke, Yaneli, and several uterotonics with total EBL of 3L s/p 3U pRBC and 1U FFP  - Most recent Hgb of 9.5  - Continue PO Fe supplementation  - Incision healing well     #) T1DM on insulin pump  - Managed with Dexcom and Tandem insulin pump  - Last A1c 4.9 on     - Current settings:  Basal                     Carb Ratio         CF     [id number] 0.85          1:11            1:35  [id number] 0.95          1:11            1:35   [id number] 0.95          1:7              " 1:35  [id number] 0.9->0.85*          1:6->1.7*              1:35  [id number] 0.85        1:7              1:35  - Changes to settings made as above. Pt having some low values between 2-4pm, so decreased basal dose and carb ratio   - Missed optho exam earlier this year. Recommended re-scheduling  - Follows with Dr. Dukes (endocrinology) who performs foot exams. Patient will schedule follow-up with him     #) Contraception  - Patient currently formula feeding  - Prescribed POPs but okay to transition to combined OCPs 6 weeks PP    #) Postpartum  - Mood stable  - Good support at home    #) Routine gyn  - Last pap 2/2021- NILM. Repeat in one year      RTC in 3 weeks    Patient seen and evaluated with Dr. Tressa hCan MD

## 2023-10-30 DIAGNOSIS — Z30.011 ENCOUNTER FOR INITIAL PRESCRIPTION OF CONTRACEPTIVE PILLS: Primary | ICD-10-CM

## 2023-11-09 LAB — REFLEX ADDED, ANEMIA PANEL: NORMAL

## 2024-01-18 ENCOUNTER — OFFICE VISIT (OUTPATIENT)
Dept: ENDOCRINOLOGY | Facility: CLINIC | Age: 29
End: 2024-01-18
Payer: MEDICAID

## 2024-01-18 VITALS
SYSTOLIC BLOOD PRESSURE: 115 MMHG | WEIGHT: 176 LBS | DIASTOLIC BLOOD PRESSURE: 75 MMHG | HEART RATE: 78 BPM | BODY MASS INDEX: 28.41 KG/M2

## 2024-01-18 DIAGNOSIS — E10.9 TYPE 1 DIABETES MELLITUS WITHOUT COMPLICATION (MULTI): Primary | ICD-10-CM

## 2024-01-18 DIAGNOSIS — O24.013 TYPE 1 DIABETES MELLITUS AFFECTING PREGNANCY IN THIRD TRIMESTER, ANTEPARTUM (HHS-HCC): ICD-10-CM

## 2024-01-18 LAB — POC HEMOGLOBIN A1C: 5.5 % (ref 4.2–6.5)

## 2024-01-18 PROCEDURE — 3074F SYST BP LT 130 MM HG: CPT | Performed by: INTERNAL MEDICINE

## 2024-01-18 PROCEDURE — 99214 OFFICE O/P EST MOD 30 MIN: CPT | Performed by: INTERNAL MEDICINE

## 2024-01-18 PROCEDURE — 3078F DIAST BP <80 MM HG: CPT | Performed by: INTERNAL MEDICINE

## 2024-01-18 PROCEDURE — 83036 HEMOGLOBIN GLYCOSYLATED A1C: CPT | Performed by: INTERNAL MEDICINE

## 2024-01-18 PROCEDURE — 1036F TOBACCO NON-USER: CPT | Performed by: INTERNAL MEDICINE

## 2024-01-18 RX ORDER — BLOOD-GLUCOSE SENSOR
EACH MISCELLANEOUS
COMMUNITY
Start: 2024-01-03 | End: 2024-01-18 | Stop reason: SDUPTHER

## 2024-01-18 RX ORDER — INSULIN LISPRO 100 [IU]/ML
INJECTION, SOLUTION INTRAVENOUS; SUBCUTANEOUS
COMMUNITY
Start: 2023-08-28 | End: 2024-01-18 | Stop reason: SDUPTHER

## 2024-01-18 RX ORDER — INSULIN LISPRO 100 [IU]/ML
INJECTION, SOLUTION INTRAVENOUS; SUBCUTANEOUS
Qty: 100 ML | Refills: 3 | Status: SHIPPED | OUTPATIENT
Start: 2024-01-18

## 2024-01-18 RX ORDER — BLOOD-GLUCOSE SENSOR
EACH MISCELLANEOUS
Qty: 9 EACH | Refills: 3 | Status: SHIPPED | OUTPATIENT
Start: 2024-01-18

## 2024-01-18 RX ORDER — BLOOD-GLUCOSE TRANSMITTER
EACH MISCELLANEOUS
Qty: 1 EACH | Refills: 3 | Status: SHIPPED | OUTPATIENT
Start: 2024-01-18

## 2024-01-18 ASSESSMENT — PAIN SCALES - GENERAL: PAINLEVEL: 0-NO PAIN

## 2024-01-18 ASSESSMENT — PATIENT HEALTH QUESTIONNAIRE - PHQ9
2. FEELING DOWN, DEPRESSED OR HOPELESS: NOT AT ALL
SUM OF ALL RESPONSES TO PHQ9 QUESTIONS 1 & 2: 0
1. LITTLE INTEREST OR PLEASURE IN DOING THINGS: NOT AT ALL

## 2024-01-18 ASSESSMENT — ENCOUNTER SYMPTOMS
LOSS OF SENSATION IN FEET: 0
OCCASIONAL FEELINGS OF UNSTEADINESS: 0
DEPRESSION: 0

## 2024-01-18 NOTE — PROGRESS NOTES
HPI   29 yo of [facility] practice with Type 1 Dm (dx age 9) here in follow up.  Pt had 1st child oct 9, 2023 complicated by preeclampsia.  A1c-5.5% today (falsely low after recent childbirth/anemia).    Pt using tslim with dexcom g6.  Basal rates:  12am-0.85, 1:11  3am-0.95  7am-0.95, 1:7  2pm-0.85    Isf-35    30 day dexcom data: 73% in range, 3% low, pattern: mid/ upper 100's overnight, waking low 100's/up to 180 post breakfast, mid 100's at lunch, after dinner up to 180, mid/upper 100's into bedtime.    -mild depression on medication prior to pregnancy, seeing pcp to get back on medication    Current Outpatient Medications:     acetone, urine, test (Ketostix) strip, use as directed when blood sugar is over 250 or when ill, Disp: , Rfl:     blood sugar diagnostic (Contour Next Test Strips) strip, test bg 8-9 times per day, Disp: , Rfl:     Dexcom G4 platinum transmitter (Dexcom G6 Transmitter) device, Use one every 90 days, Disp: 1 each, Rfl: 3    Dexcom G6 Sensor device, USE AS DIRECTED REPLACE every 10 days, Disp: , Rfl:     glucagon (Glucagen) 1 mg injection, inject 1 mg as directed for severe hypoglycemia, Disp: , Rfl:     insulin lispro (HumaLOG) 100 unit/mL injection, inject up to 100 units VIA INSULIN PUMP DAILY DURING PREGNANCY, Disp: , Rfl:     drospirenone, contraceptive, 4 mg (28) tablet, Take 1 tablet by mouth once daily., Disp: 30 tablet, Rfl: 2      Allergies as of 01/18/2024    (No Known Allergies)         Review of Systems   Cardiology: Lightheadedness-denies.  Chest pain-denies.  Leg edema-denies.  Palpitations-denies.  Respiratory: Cough-denies. Shortness of breath-denies.  Wheezing-denies.  Gastroenterology: Constipation-denies.  Diarrhea-denies.  Heartburn-denies.  Endocrinology: Cold intolerance-denies.  Heat intolerance-denies.  Sweats-denies.  Neurology: Headache-denies.  Tremor-denies.  Neuropathy in extremities-denies.  Psychology: Low energy-at times.  Irritability-denies.  Sleep  "disturbances-denies.      /75 (BP Location: Left arm)   Pulse 78   Wt 79.8 kg (176 lb)   BMI 28.41 kg/m²       Labs:  Lab Results   Component Value Date    WBC 9.3 10/14/2023    NRBC 0.0 10/14/2023    RBC 3.10 (L) 10/14/2023    HGB 9.5 (L) 10/14/2023    HCT 29.4 (L) 10/14/2023     10/14/2023     Lab Results   Component Value Date    CALCIUM 8.2 (L) 10/13/2023    AST 24 10/13/2023    ALKPHOS 131 (H) 10/13/2023    BILITOT 0.6 10/13/2023    PROT 6.1 (L) 10/13/2023    ALBUMIN 3.1 (L) 10/13/2023    GLOB 3.4 07/11/2022    AGR 1.2 (L) 07/11/2022     10/13/2023    K 3.8 10/13/2023     (H) 10/13/2023    CO2 21 10/13/2023    ANIONGAP 15 10/13/2023    BUN 9 10/13/2023    CREATININE 0.64 10/13/2023    UREACREAUR 14.3 07/11/2022    GLUCOSE 119 (H) 10/13/2023    ALT 14 10/13/2023    EGFR >90 10/13/2023     Lab Results   Component Value Date    CHOL 207 (H) 07/11/2022    TRIG 119 07/11/2022    HDL 79 07/11/2022    LDLCALC 104 07/11/2022     No results found for: \"MICROALBCREA\"  Lab Results   Component Value Date    TSH 1.72 05/12/2023     No results found for: \"HFGOXYYH45\"  Lab Results   Component Value Date    HGBA1C 5.5 01/18/2024         Physical Exam   General Appearance: pleasant, cooperative, no acute distress  HEENT: no chemosis, no proptosis, no lid lag, no lid retraction  Neck: no lymphadenopathy, no thyromegaly, no dominant thyroid nodules  Heart: no murmurs, regular rate and rhythm, S1 and S2  Lungs: no wheezes, no rhonci, no rales  Extremities: no lower extremity swelling      Assessment/Plan   1. 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  Ordered tests.   Risk of complications and morbidities: Is definite because of use of insulin and risk of hypoglycemia.  Prescription medications reviewed and modifications made.  Compliance assessed.  Addressed social determinants of health including food insecurity.

## 2024-01-30 PROBLEM — O35.9XX0 SUSPECTED FETAL ABNORMALITY AFFECTING MANAGEMENT OF MOTHER (HHS-HCC): Status: ACTIVE | Noted: 2024-01-30

## 2024-01-30 PROBLEM — H04.19 DISORDER OF LACRIMAL GLAND: Status: ACTIVE | Noted: 2023-09-25

## 2024-01-30 PROBLEM — G89.18 POSTOPERATIVE PAIN: Status: ACTIVE | Noted: 2023-10-07

## 2024-02-01 ENCOUNTER — OFFICE VISIT (OUTPATIENT)
Dept: PRIMARY CARE | Facility: CLINIC | Age: 29
End: 2024-02-01
Payer: MEDICAID

## 2024-02-01 VITALS
BODY MASS INDEX: 28.06 KG/M2 | SYSTOLIC BLOOD PRESSURE: 102 MMHG | DIASTOLIC BLOOD PRESSURE: 56 MMHG | WEIGHT: 174.6 LBS | TEMPERATURE: 97.9 F | OXYGEN SATURATION: 98 % | HEART RATE: 83 BPM | HEIGHT: 66 IN

## 2024-02-01 DIAGNOSIS — F33.1 MODERATE EPISODE OF RECURRENT MAJOR DEPRESSIVE DISORDER (MULTI): ICD-10-CM

## 2024-02-01 DIAGNOSIS — F41.9 ANXIETY: ICD-10-CM

## 2024-02-01 DIAGNOSIS — E10.65 TYPE 1 DIABETES MELLITUS WITH HYPERGLYCEMIA (MULTI): Primary | ICD-10-CM

## 2024-02-01 PROBLEM — O14.10 PRE-ECLAMPSIA, SEVERE (HHS-HCC): Status: RESOLVED | Noted: 2023-10-13 | Resolved: 2024-02-01

## 2024-02-01 PROBLEM — K21.9 GASTROESOPHAGEAL REFLUX DISEASE: Status: RESOLVED | Noted: 2023-10-07 | Resolved: 2024-02-01

## 2024-02-01 PROBLEM — Z98.891 STATUS POST PRIMARY LOW TRANSVERSE CESAREAN SECTION: Status: RESOLVED | Noted: 2023-10-13 | Resolved: 2024-02-01

## 2024-02-01 PROBLEM — H04.123 DRY EYE SYNDROME OF BOTH LACRIMAL GLANDS: Status: RESOLVED | Noted: 2023-09-25 | Resolved: 2024-02-01

## 2024-02-01 PROBLEM — G89.18 POSTOPERATIVE PAIN: Status: RESOLVED | Noted: 2023-10-07 | Resolved: 2024-02-01

## 2024-02-01 PROBLEM — O35.9XX0 SUSPECTED FETAL ABNORMALITY AFFECTING MANAGEMENT OF MOTHER (HHS-HCC): Status: RESOLVED | Noted: 2024-01-30 | Resolved: 2024-02-01

## 2024-02-01 PROCEDURE — 1036F TOBACCO NON-USER: CPT | Performed by: NURSE PRACTITIONER

## 2024-02-01 PROCEDURE — 99214 OFFICE O/P EST MOD 30 MIN: CPT | Performed by: NURSE PRACTITIONER

## 2024-02-01 PROCEDURE — 3074F SYST BP LT 130 MM HG: CPT | Performed by: NURSE PRACTITIONER

## 2024-02-01 PROCEDURE — 3078F DIAST BP <80 MM HG: CPT | Performed by: NURSE PRACTITIONER

## 2024-02-01 RX ORDER — DEXTROSE MONOHYDRATE 100 MG/ML
INJECTION, SOLUTION INTRAVENOUS
COMMUNITY
Start: 2023-10-07 | End: 2024-02-01 | Stop reason: ALTCHOICE

## 2024-02-01 RX ORDER — TRANEXAMIC ACID 100 MG/ML
1000 INJECTION, SOLUTION INTRAVENOUS
COMMUNITY
Start: 2023-10-09 | End: 2024-02-01 | Stop reason: ALTCHOICE

## 2024-02-01 RX ORDER — ADHESIVE BANDAGE
10 BANDAGE TOPICAL
COMMUNITY
Start: 2023-10-09 | End: 2024-02-01 | Stop reason: ALTCHOICE

## 2024-02-01 RX ORDER — SIMETHICONE 80 MG
80 TABLET,CHEWABLE ORAL 4 TIMES DAILY PRN
COMMUNITY
Start: 2023-10-09 | End: 2024-02-01 | Stop reason: ALTCHOICE

## 2024-02-01 RX ORDER — POLYETHYLENE GLYCOL 3350 17 G/17G
17 POWDER, FOR SOLUTION ORAL 2 TIMES DAILY PRN
COMMUNITY
Start: 2023-10-09 | End: 2024-02-01 | Stop reason: ALTCHOICE

## 2024-02-01 RX ORDER — OXYTOCIN 10 [USP'U]/ML
10 INJECTION, SOLUTION INTRAMUSCULAR; INTRAVENOUS
COMMUNITY
Start: 2023-10-09 | End: 2024-02-01 | Stop reason: ALTCHOICE

## 2024-02-01 RX ORDER — FLUOXETINE 10 MG/1
10 CAPSULE ORAL DAILY
Qty: 30 CAPSULE | Refills: 1 | Status: SHIPPED | OUTPATIENT
Start: 2024-02-01 | End: 2024-03-15 | Stop reason: DRUGHIGH

## 2024-02-01 RX ORDER — LIDOCAINE 560 MG/1
1 PATCH PERCUTANEOUS; TOPICAL; TRANSDERMAL
COMMUNITY
Start: 2023-10-09 | End: 2024-02-01 | Stop reason: ALTCHOICE

## 2024-02-01 RX ORDER — BISACODYL 10 MG/1
10 SUPPOSITORY RECTAL DAILY PRN
COMMUNITY
Start: 2023-10-09 | End: 2024-02-01 | Stop reason: ALTCHOICE

## 2024-02-01 RX ORDER — METHYLERGONOVINE MALEATE 0.2 MG/ML
0.2 INJECTION INTRAVENOUS
COMMUNITY
Start: 2023-10-09 | End: 2024-02-01 | Stop reason: ALTCHOICE

## 2024-02-01 RX ORDER — DEXTROSE, SODIUM CHLORIDE, SODIUM LACTATE, POTASSIUM CHLORIDE, AND CALCIUM CHLORIDE 5; .6; .31; .03; .02 G/100ML; G/100ML; G/100ML; G/100ML; G/100ML
125 INJECTION, SOLUTION INTRAVENOUS
COMMUNITY
Start: 2023-10-08 | End: 2024-02-01 | Stop reason: ALTCHOICE

## 2024-02-01 RX ORDER — HYDRALAZINE HYDROCHLORIDE 20 MG/ML
5 INJECTION INTRAMUSCULAR; INTRAVENOUS
COMMUNITY
Start: 2023-10-09 | End: 2024-02-01 | Stop reason: ALTCHOICE

## 2024-02-01 RX ORDER — HYDROMORPHONE HYDROCHLORIDE 1 MG/ML
0.2 INJECTION, SOLUTION INTRAMUSCULAR; INTRAVENOUS; SUBCUTANEOUS
COMMUNITY
Start: 2023-10-09 | End: 2024-02-01 | Stop reason: ALTCHOICE

## 2024-02-01 RX ORDER — CARBOPROST TROMETHAMINE 250 UG/ML
250 INJECTION, SOLUTION INTRAMUSCULAR
COMMUNITY
Start: 2023-10-09 | End: 2024-02-01 | Stop reason: ALTCHOICE

## 2024-02-01 RX ORDER — NALOXONE HYDROCHLORIDE 0.4 MG/ML
0.1 INJECTION, SOLUTION INTRAMUSCULAR; INTRAVENOUS; SUBCUTANEOUS
COMMUNITY
Start: 2023-10-09 | End: 2024-02-01 | Stop reason: ALTCHOICE

## 2024-02-01 RX ORDER — LABETALOL HYDROCHLORIDE 5 MG/ML
20 INJECTION, SOLUTION INTRAVENOUS
COMMUNITY
Start: 2023-10-09 | End: 2024-02-01 | Stop reason: ALTCHOICE

## 2024-02-01 RX ORDER — MISOPROSTOL 200 UG/1
800 TABLET ORAL
COMMUNITY
Start: 2023-10-09 | End: 2024-02-01 | Stop reason: ALTCHOICE

## 2024-02-01 ASSESSMENT — PATIENT HEALTH QUESTIONNAIRE - PHQ9
10. IF YOU CHECKED OFF ANY PROBLEMS, HOW DIFFICULT HAVE THESE PROBLEMS MADE IT FOR YOU TO DO YOUR WORK, TAKE CARE OF THINGS AT HOME, OR GET ALONG WITH OTHER PEOPLE: SOMEWHAT DIFFICULT
SUM OF ALL RESPONSES TO PHQ9 QUESTIONS 1 AND 2: 2
2. FEELING DOWN, DEPRESSED OR HOPELESS: SEVERAL DAYS
1. LITTLE INTEREST OR PLEASURE IN DOING THINGS: SEVERAL DAYS

## 2024-02-01 ASSESSMENT — ENCOUNTER SYMPTOMS
ABDOMINAL PAIN: 0
BACK PAIN: 0
DEPRESSED MOOD: 1
DIZZINESS: 0
BRUISES/BLEEDS EASILY: 0
DEPRESSION: 1
CONSTIPATION: 0
COUGH: 0
PANIC: 0
BLOOD IN STOOL: 0
HEADACHES: 0
WHEEZING: 0
ADENOPATHY: 0
SINUS PAIN: 0
DYSURIA: 0
PALPITATIONS: 0
HEMATURIA: 0
LOSS OF SENSATION IN FEET: 0
ACTIVITY CHANGE: 0
PHOTOPHOBIA: 0
OCCASIONAL FEELINGS OF UNSTEADINESS: 0
ARTHRALGIAS: 0
IRRITABILITY: 1
DIARRHEA: 0
AGITATION: 0
APPETITE CHANGE: 0
NERVOUS/ANXIOUS: 0
TREMORS: 0
WEAKNESS: 0
SHORTNESS OF BREATH: 0
EYE DISCHARGE: 0
SORE THROAT: 0
JOINT SWELLING: 0

## 2024-02-01 ASSESSMENT — PAIN SCALES - GENERAL: PAINLEVEL: 0-NO PAIN

## 2024-02-01 ASSESSMENT — LIFESTYLE VARIABLES
HAVE YOU OR SOMEONE ELSE BEEN INJURED AS A RESULT OF YOUR DRINKING: NO
AUDIT TOTAL SCORE: 2
HOW OFTEN DURING THE LAST YEAR HAVE YOU HAD A FEELING OF GUILT OR REMORSE AFTER DRINKING: NEVER
HOW OFTEN DO YOU HAVE SIX OR MORE DRINKS ON ONE OCCASION: LESS THAN MONTHLY
AUDIT-C TOTAL SCORE: 2
HOW OFTEN DURING THE LAST YEAR HAVE YOU FOUND THAT YOU WERE NOT ABLE TO STOP DRINKING ONCE YOU HAD STARTED: NEVER
HOW OFTEN DURING THE LAST YEAR HAVE YOU FAILED TO DO WHAT WAS NORMALLY EXPECTED FROM YOU BECAUSE OF DRINKING: NEVER
HAS A RELATIVE, FRIEND, DOCTOR, OR ANOTHER HEALTH PROFESSIONAL EXPRESSED CONCERN ABOUT YOUR DRINKING OR SUGGESTED YOU CUT DOWN: NO
HOW OFTEN DO YOU HAVE A DRINK CONTAINING ALCOHOL: MONTHLY OR LESS
HOW MANY STANDARD DRINKS CONTAINING ALCOHOL DO YOU HAVE ON A TYPICAL DAY: 1 OR 2
HOW OFTEN DURING THE LAST YEAR HAVE YOU NEEDED AN ALCOHOLIC DRINK FIRST THING IN THE MORNING TO GET YOURSELF GOING AFTER A NIGHT OF HEAVY DRINKING: NEVER
SKIP TO QUESTIONS 9-10: 0
HOW OFTEN DURING THE LAST YEAR HAVE YOU BEEN UNABLE TO REMEMBER WHAT HAPPENED THE NIGHT BEFORE BECAUSE YOU HAD BEEN DRINKING: NEVER

## 2024-02-01 NOTE — PROGRESS NOTES
Subjective   Patient ID: Becca Irene is a 28 y.o. female who presents for Anxiety.    Presents today to get her fluoxetine reinstated for her anxiety and depression.  She was off of it while she was pregnant and after she gave birth.  She is not breast-feeding and would like to restart her fluoxetine.  She states that her fluoxetine controls her symptoms both anxiety and depression very well and she did not have any side effects.  She follows with Dr. Carrillo for her diabetes.  She currently has a an insulin pump with a Dexcom G4 which communicates and helps to manage her blood sugars.  Her last A1c was 5.7 last month.  Denies any periods of low blood sugars.  She denies feelings of wanting to harm herself or others.  She also has no feelings of wanting to harm her baby.  She gave birth to the baby 4 months ago via .  There were no postpartum complications.    Anxiety  Presents for follow-up visit. Symptoms include depressed mood and irritability. Patient reports no chest pain, dizziness, dry mouth, nervous/anxious behavior, palpitations, panic, shortness of breath or suicidal ideas. Symptoms occur most days. The severity of symptoms is moderate. The patient sleeps 6 hours per night. The quality of sleep is good. Nighttime awakenings: one to two.            Review of Systems   Constitutional:  Positive for irritability. Negative for activity change and appetite change.   HENT:  Negative for congestion, ear pain, hearing loss, sinus pain and sore throat.    Eyes:  Negative for photophobia, discharge and visual disturbance.   Respiratory:  Negative for cough, shortness of breath and wheezing.    Cardiovascular:  Negative for chest pain, palpitations and leg swelling.   Gastrointestinal:  Negative for abdominal pain, blood in stool, constipation and diarrhea.   Endocrine: Negative for cold intolerance, heat intolerance and polyuria.   Genitourinary:  Negative for dysuria and hematuria.   Musculoskeletal:   "Negative for arthralgias, back pain and joint swelling.   Skin:  Negative for rash.   Allergic/Immunologic: Negative for environmental allergies and food allergies.   Neurological:  Negative for dizziness, tremors, syncope, weakness and headaches.   Hematological:  Negative for adenopathy. Does not bruise/bleed easily.   Psychiatric/Behavioral:  Negative for agitation and suicidal ideas. The patient is not nervous/anxious.        Objective   /56 (BP Location: Right arm, Patient Position: Sitting)   Pulse 83   Temp 36.6 °C (97.9 °F)   Ht 1.676 m (5' 6\")   Wt 79.2 kg (174 lb 9.6 oz)   SpO2 98%   BMI 28.18 kg/m²     Physical Exam  Vitals reviewed.   Constitutional:       General: She is not in acute distress.     Appearance: Normal appearance.   HENT:      Head: Normocephalic.      Right Ear: Tympanic membrane normal.      Left Ear: Tympanic membrane normal.      Nose: Nose normal.      Mouth/Throat:      Mouth: Mucous membranes are moist.   Eyes:      Conjunctiva/sclera: Conjunctivae normal.      Pupils: Pupils are equal, round, and reactive to light.   Cardiovascular:      Rate and Rhythm: Normal rate and regular rhythm.      Pulses: Normal pulses.      Heart sounds: Normal heart sounds.   Pulmonary:      Effort: Pulmonary effort is normal.      Breath sounds: Normal breath sounds.   Abdominal:      General: Bowel sounds are normal.      Palpations: Abdomen is soft.   Musculoskeletal:         General: Normal range of motion.   Skin:     General: Skin is warm and dry.      Capillary Refill: Capillary refill takes less than 2 seconds.   Neurological:      Mental Status: She is alert and oriented to person, place, and time.   Psychiatric:         Mood and Affect: Mood normal.         Speech: Speech normal.         Assessment/Plan   Problem List Items Addressed This Visit             ICD-10-CM    Type 1 diabetes mellitus (CMS/HCC) - Primary E10.9    Anxiety F41.9    Relevant Medications    FLUoxetine (PROzac) " 10 mg capsule    Other Relevant Orders    Follow Up In Primary Care - Established    Depression F32.A    Relevant Medications    FLUoxetine (PROzac) 10 mg capsule    Other Relevant Orders    Follow Up In Primary Care - Established        Focus on healthy eating including lean proteins and vegetables.  Avoid high carbohydrate high sugar foods and drinks.  Try to increase physical activity as tolerated.  Goal is for 160 minutes/week of physical activity.  Start Fluoxetine once a day   Follow up in 1 month

## 2024-02-01 NOTE — PATIENT INSTRUCTIONS
Focus on healthy eating including lean proteins and vegetables.  Avoid high carbohydrate high sugar foods and drinks.  Try to increase physical activity as tolerated.  Goal is for 160 minutes/week of physical activity.  Start Fluoxetine once a day   Follow up in 1 month

## 2024-03-15 ENCOUNTER — OFFICE VISIT (OUTPATIENT)
Dept: PRIMARY CARE | Facility: CLINIC | Age: 29
End: 2024-03-15
Payer: MEDICAID

## 2024-03-15 VITALS
DIASTOLIC BLOOD PRESSURE: 74 MMHG | OXYGEN SATURATION: 99 % | TEMPERATURE: 98.1 F | BODY MASS INDEX: 27.48 KG/M2 | HEIGHT: 66 IN | HEART RATE: 79 BPM | SYSTOLIC BLOOD PRESSURE: 120 MMHG | WEIGHT: 171 LBS

## 2024-03-15 DIAGNOSIS — F33.1 MODERATE EPISODE OF RECURRENT MAJOR DEPRESSIVE DISORDER (MULTI): ICD-10-CM

## 2024-03-15 DIAGNOSIS — E10.9 TYPE 1 DIABETES MELLITUS WITHOUT COMPLICATION (MULTI): Primary | ICD-10-CM

## 2024-03-15 DIAGNOSIS — F41.9 ANXIETY: ICD-10-CM

## 2024-03-15 DIAGNOSIS — M25.552 BILATERAL HIP PAIN: ICD-10-CM

## 2024-03-15 DIAGNOSIS — M25.551 BILATERAL HIP PAIN: ICD-10-CM

## 2024-03-15 PROBLEM — M25.561 CHRONIC PAIN OF BOTH KNEES: Status: ACTIVE | Noted: 2024-03-15

## 2024-03-15 PROBLEM — G89.29 CHRONIC PAIN OF BOTH KNEES: Status: ACTIVE | Noted: 2024-03-15

## 2024-03-15 PROBLEM — M25.562 CHRONIC PAIN OF BOTH KNEES: Status: ACTIVE | Noted: 2024-03-15

## 2024-03-15 PROCEDURE — 99214 OFFICE O/P EST MOD 30 MIN: CPT | Performed by: NURSE PRACTITIONER

## 2024-03-15 PROCEDURE — 3078F DIAST BP <80 MM HG: CPT | Performed by: NURSE PRACTITIONER

## 2024-03-15 PROCEDURE — 1036F TOBACCO NON-USER: CPT | Performed by: NURSE PRACTITIONER

## 2024-03-15 PROCEDURE — 3074F SYST BP LT 130 MM HG: CPT | Performed by: NURSE PRACTITIONER

## 2024-03-15 RX ORDER — FLUOXETINE HYDROCHLORIDE 20 MG/1
20 CAPSULE ORAL DAILY
Qty: 30 CAPSULE | Refills: 1 | Status: SHIPPED | OUTPATIENT
Start: 2024-03-15 | End: 2024-05-15

## 2024-03-15 ASSESSMENT — LIFESTYLE VARIABLES
HOW OFTEN DURING THE LAST YEAR HAVE YOU NEEDED AN ALCOHOLIC DRINK FIRST THING IN THE MORNING TO GET YOURSELF GOING AFTER A NIGHT OF HEAVY DRINKING: NEVER
HOW OFTEN DURING THE LAST YEAR HAVE YOU BEEN UNABLE TO REMEMBER WHAT HAPPENED THE NIGHT BEFORE BECAUSE YOU HAD BEEN DRINKING: NEVER
HOW OFTEN DO YOU HAVE SIX OR MORE DRINKS ON ONE OCCASION: NEVER
AUDIT TOTAL SCORE: 2
HOW OFTEN DURING THE LAST YEAR HAVE YOU HAD A FEELING OF GUILT OR REMORSE AFTER DRINKING: NEVER
HOW OFTEN DURING THE LAST YEAR HAVE YOU FOUND THAT YOU WERE NOT ABLE TO STOP DRINKING ONCE YOU HAD STARTED: NEVER
AUDIT-C TOTAL SCORE: 2
HAVE YOU OR SOMEONE ELSE BEEN INJURED AS A RESULT OF YOUR DRINKING: NO
HAS A RELATIVE, FRIEND, DOCTOR, OR ANOTHER HEALTH PROFESSIONAL EXPRESSED CONCERN ABOUT YOUR DRINKING OR SUGGESTED YOU CUT DOWN: NO
HOW OFTEN DO YOU HAVE A DRINK CONTAINING ALCOHOL: 2-4 TIMES A MONTH
SKIP TO QUESTIONS 9-10: 1
HOW MANY STANDARD DRINKS CONTAINING ALCOHOL DO YOU HAVE ON A TYPICAL DAY: 1 OR 2
HOW OFTEN DURING THE LAST YEAR HAVE YOU FAILED TO DO WHAT WAS NORMALLY EXPECTED FROM YOU BECAUSE OF DRINKING: NEVER

## 2024-03-15 ASSESSMENT — PAIN SCALES - GENERAL: PAINLEVEL: 2

## 2024-03-15 ASSESSMENT — ENCOUNTER SYMPTOMS
CONSTITUTIONAL NEGATIVE: 1
GASTROINTESTINAL NEGATIVE: 1
RESPIRATORY NEGATIVE: 1
ENDOCRINE NEGATIVE: 1
DEPRESSION: 0
ALLERGIC/IMMUNOLOGIC NEGATIVE: 1
EYES NEGATIVE: 1
OCCASIONAL FEELINGS OF UNSTEADINESS: 0
MUSCULOSKELETAL NEGATIVE: 1
HEMATOLOGIC/LYMPHATIC NEGATIVE: 1
PSYCHIATRIC NEGATIVE: 1
CARDIOVASCULAR NEGATIVE: 1
NEUROLOGICAL NEGATIVE: 1
LOSS OF SENSATION IN FEET: 0

## 2024-03-15 ASSESSMENT — PATIENT HEALTH QUESTIONNAIRE - PHQ9
1. LITTLE INTEREST OR PLEASURE IN DOING THINGS: NOT AT ALL
2. FEELING DOWN, DEPRESSED OR HOPELESS: NOT AT ALL
SUM OF ALL RESPONSES TO PHQ9 QUESTIONS 1 AND 2: 0

## 2024-03-15 NOTE — PATIENT INSTRUCTIONS
Focus on healthy eating including lean proteins and vegetables.  Avoid high carbohydrate high sugar foods and drinks.  Try to increase physical activity as tolerated.  Goal is for 160 minutes/week of physical activity.  Increase Prozac to 20 mg once a day. Take medication as directed.  Continue Ibuprofen for hip and leg pain. Please follow up if there is no improvement.

## 2024-03-15 NOTE — PROGRESS NOTES
"Subjective   Patient ID: Becca Irene is a 28 y.o. female who presents for Knee Pain (Pt present for left knee pain and bilateral hip pain for 3 months.).    Pt presents follow up for depression and anxiety. She feels the medication has helped a little bit but states that she was hopeful her symptoms would improve more than they have.  She reports her depression has improved some, but her anxiety is almost unchanged. She also has concerns of bilateral hip and knee pain. She has been taking ibuprofen with relief of symptoms. She is 5 months postpartum. She has not tried heat or ice to see if they are effective in managing the pain symptoms.    Knee Pain   The incident occurred more than 1 week ago. There was no injury mechanism. The pain is present in the left knee, right knee, right hip and left hip. The quality of the pain is described as aching. The pain is at a severity of 2/10. The pain is mild. The pain has been Intermittent since onset. She reports no foreign bodies present. Nothing aggravates the symptoms. She has tried NSAIDs for the symptoms. The treatment provided moderate relief.        Review of Systems   Constitutional: Negative.    HENT: Negative.     Eyes: Negative.    Respiratory: Negative.     Cardiovascular: Negative.    Gastrointestinal: Negative.    Endocrine: Negative.    Genitourinary: Negative.    Musculoskeletal: Negative.    Skin: Negative.    Allergic/Immunologic: Negative.    Neurological: Negative.    Hematological: Negative.    Psychiatric/Behavioral: Negative.         Objective   /74 (BP Location: Right arm, Patient Position: Sitting)   Pulse 79   Temp 36.7 °C (98.1 °F) (Temporal)   Ht 1.676 m (5' 6\")   Wt 77.6 kg (171 lb)   SpO2 99%   BMI 27.60 kg/m²     Physical Exam  Constitutional:       Appearance: Normal appearance.   HENT:      Head: Normocephalic and atraumatic.      Right Ear: Tympanic membrane normal.      Left Ear: Tympanic membrane normal.      Nose: Nose " normal.      Mouth/Throat:      Mouth: Mucous membranes are moist.   Eyes:      Pupils: Pupils are equal, round, and reactive to light.   Cardiovascular:      Rate and Rhythm: Normal rate and regular rhythm.      Pulses: Normal pulses.      Heart sounds: Normal heart sounds.   Pulmonary:      Effort: Pulmonary effort is normal.      Breath sounds: Normal breath sounds.   Abdominal:      General: Abdomen is flat.   Musculoskeletal:         General: Normal range of motion.      Cervical back: Normal range of motion.   Skin:     General: Skin is warm.      Capillary Refill: Capillary refill takes less than 2 seconds.   Neurological:      General: No focal deficit present.      Mental Status: She is alert.   Psychiatric:         Mood and Affect: Mood normal.         Assessment/Plan   Problem List Items Addressed This Visit             ICD-10-CM    Type 1 diabetes mellitus (CMS/[facility]) - Primary E10.9    Anxiety F41.9    Relevant Medications    FLUoxetine (PROzac) 20 mg capsule    Depression F32.A    Relevant Medications    FLUoxetine (PROzac) 20 mg capsule     Other Visit Diagnoses         Codes    Bilateral hip pain     M25.551, M25.552        Focus on healthy eating including lean proteins and vegetables.  Avoid high carbohydrate high sugar foods and drinks.  Try to increase physical activity as tolerated.  Goal is for 160 minutes/week of physical activity.  Increase Prozac to 20 mg once a day. Take medication as directed.  Continue Ibuprofen for hip and leg pain. Please follow up if there is no improvement.

## 2024-05-15 DIAGNOSIS — F33.1 MODERATE EPISODE OF RECURRENT MAJOR DEPRESSIVE DISORDER (MULTI): ICD-10-CM

## 2024-05-15 DIAGNOSIS — F41.9 ANXIETY: ICD-10-CM

## 2024-05-15 RX ORDER — FLUOXETINE HYDROCHLORIDE 20 MG/1
20 CAPSULE ORAL DAILY
Qty: 30 CAPSULE | Refills: 3 | Status: SHIPPED | OUTPATIENT
Start: 2024-05-15

## 2024-07-15 ENCOUNTER — APPOINTMENT (OUTPATIENT)
Dept: ENDOCRINOLOGY | Facility: CLINIC | Age: 29
End: 2024-07-15
Payer: MEDICAID

## 2024-08-28 ENCOUNTER — OFFICE VISIT (OUTPATIENT)
Dept: PRIMARY CARE | Facility: CLINIC | Age: 29
End: 2024-08-28
Payer: MEDICAID

## 2024-08-28 VITALS
HEART RATE: 79 BPM | BODY MASS INDEX: 31.82 KG/M2 | OXYGEN SATURATION: 98 % | HEIGHT: 66 IN | TEMPERATURE: 98.1 F | SYSTOLIC BLOOD PRESSURE: 126 MMHG | WEIGHT: 198 LBS | DIASTOLIC BLOOD PRESSURE: 70 MMHG

## 2024-08-28 DIAGNOSIS — F33.1 MODERATE EPISODE OF RECURRENT MAJOR DEPRESSIVE DISORDER (MULTI): ICD-10-CM

## 2024-08-28 DIAGNOSIS — R53.83 FATIGUE, UNSPECIFIED TYPE: ICD-10-CM

## 2024-08-28 DIAGNOSIS — E10.65 TYPE 1 DIABETES MELLITUS WITH HYPERGLYCEMIA (MULTI): Primary | ICD-10-CM

## 2024-08-28 DIAGNOSIS — F41.9 ANXIETY: ICD-10-CM

## 2024-08-28 DIAGNOSIS — E55.9 VITAMIN D DEFICIENCY: ICD-10-CM

## 2024-08-28 DIAGNOSIS — E78.00 HYPERCHOLESTEROLEMIA: ICD-10-CM

## 2024-08-28 PROBLEM — G89.29 CHRONIC PAIN OF BOTH KNEES: Status: RESOLVED | Noted: 2024-03-15 | Resolved: 2024-08-28

## 2024-08-28 PROBLEM — M25.562 CHRONIC PAIN OF BOTH KNEES: Status: RESOLVED | Noted: 2024-03-15 | Resolved: 2024-08-28

## 2024-08-28 PROBLEM — E11.36: Status: RESOLVED | Noted: 2023-09-25 | Resolved: 2024-08-28

## 2024-08-28 PROBLEM — M25.561 CHRONIC PAIN OF BOTH KNEES: Status: RESOLVED | Noted: 2024-03-15 | Resolved: 2024-08-28

## 2024-08-28 LAB
ALBUMIN SERPL BCP-MCNC: 4.4 G/DL (ref 3.4–5)
ALP SERPL-CCNC: 77 U/L (ref 33–110)
ALT SERPL W P-5'-P-CCNC: 19 U/L (ref 7–45)
ANION GAP SERPL CALC-SCNC: 12 MMOL/L (ref 10–20)
AST SERPL W P-5'-P-CCNC: 18 U/L (ref 9–39)
BASOPHILS # BLD AUTO: 0.04 X10*3/UL (ref 0–0.1)
BASOPHILS NFR BLD AUTO: 0.6 %
BILIRUB SERPL-MCNC: 0.6 MG/DL (ref 0–1.2)
BUN SERPL-MCNC: 15 MG/DL (ref 6–23)
CALCIUM SERPL-MCNC: 9.2 MG/DL (ref 8.6–10.3)
CHLORIDE SERPL-SCNC: 100 MMOL/L (ref 98–107)
CHOLEST SERPL-MCNC: 181 MG/DL (ref 0–199)
CHOLESTEROL/HDL RATIO: 2.9
CO2 SERPL-SCNC: 28 MMOL/L (ref 21–32)
CREAT SERPL-MCNC: 0.79 MG/DL (ref 0.5–1.05)
EGFRCR SERPLBLD CKD-EPI 2021: >90 ML/MIN/1.73M*2
EOSINOPHIL # BLD AUTO: 0.06 X10*3/UL (ref 0–0.7)
EOSINOPHIL NFR BLD AUTO: 1 %
ERYTHROCYTE [DISTWIDTH] IN BLOOD BY AUTOMATED COUNT: 13 % (ref 11.5–14.5)
GLUCOSE SERPL-MCNC: 248 MG/DL (ref 74–99)
HCT VFR BLD AUTO: 38.6 % (ref 36–46)
HDLC SERPL-MCNC: 63.4 MG/DL
HGB BLD-MCNC: 12.9 G/DL (ref 12–16)
IMM GRANULOCYTES # BLD AUTO: 0.02 X10*3/UL (ref 0–0.7)
IMM GRANULOCYTES NFR BLD AUTO: 0.3 % (ref 0–0.9)
LDLC SERPL CALC-MCNC: 106 MG/DL
LYMPHOCYTES # BLD AUTO: 2.1 X10*3/UL (ref 1.2–4.8)
LYMPHOCYTES NFR BLD AUTO: 33.7 %
MCH RBC QN AUTO: 29.2 PG (ref 26–34)
MCHC RBC AUTO-ENTMCNC: 33.4 G/DL (ref 32–36)
MCV RBC AUTO: 87 FL (ref 80–100)
MONOCYTES # BLD AUTO: 0.47 X10*3/UL (ref 0.1–1)
MONOCYTES NFR BLD AUTO: 7.5 %
NEUTROPHILS # BLD AUTO: 3.54 X10*3/UL (ref 1.2–7.7)
NEUTROPHILS NFR BLD AUTO: 56.9 %
NON HDL CHOLESTEROL: 118 MG/DL (ref 0–149)
NRBC BLD-RTO: 0 /100 WBCS (ref 0–0)
PLATELET # BLD AUTO: 272 X10*3/UL (ref 150–450)
POTASSIUM SERPL-SCNC: 4.8 MMOL/L (ref 3.5–5.3)
PROT SERPL-MCNC: 7.6 G/DL (ref 6.4–8.2)
RBC # BLD AUTO: 4.42 X10*6/UL (ref 4–5.2)
SODIUM SERPL-SCNC: 135 MMOL/L (ref 136–145)
TRIGL SERPL-MCNC: 58 MG/DL (ref 0–149)
TSH SERPL-ACNC: 1.12 MIU/L (ref 0.44–3.98)
VLDL: 12 MG/DL (ref 0–40)
WBC # BLD AUTO: 6.2 X10*3/UL (ref 4.4–11.3)

## 2024-08-28 PROCEDURE — 80061 LIPID PANEL: CPT

## 2024-08-28 PROCEDURE — 82306 VITAMIN D 25 HYDROXY: CPT

## 2024-08-28 PROCEDURE — 3074F SYST BP LT 130 MM HG: CPT | Performed by: NURSE PRACTITIONER

## 2024-08-28 PROCEDURE — 3078F DIAST BP <80 MM HG: CPT | Performed by: NURSE PRACTITIONER

## 2024-08-28 PROCEDURE — 3008F BODY MASS INDEX DOCD: CPT | Performed by: NURSE PRACTITIONER

## 2024-08-28 PROCEDURE — 80053 COMPREHEN METABOLIC PANEL: CPT

## 2024-08-28 PROCEDURE — 85025 COMPLETE CBC W/AUTO DIFF WBC: CPT

## 2024-08-28 PROCEDURE — 99214 OFFICE O/P EST MOD 30 MIN: CPT | Performed by: NURSE PRACTITIONER

## 2024-08-28 PROCEDURE — 3049F LDL-C 100-129 MG/DL: CPT | Performed by: NURSE PRACTITIONER

## 2024-08-28 PROCEDURE — 1036F TOBACCO NON-USER: CPT | Performed by: NURSE PRACTITIONER

## 2024-08-28 PROCEDURE — 82570 ASSAY OF URINE CREATININE: CPT

## 2024-08-28 PROCEDURE — 82043 UR ALBUMIN QUANTITATIVE: CPT

## 2024-08-28 PROCEDURE — 84443 ASSAY THYROID STIM HORMONE: CPT

## 2024-08-28 PROCEDURE — 3062F POS MACROALBUMINURIA REV: CPT | Performed by: NURSE PRACTITIONER

## 2024-08-28 RX ORDER — FLUOXETINE HYDROCHLORIDE 40 MG/1
40 CAPSULE ORAL DAILY
Qty: 30 CAPSULE | Refills: 1 | Status: SHIPPED | OUTPATIENT
Start: 2024-08-28 | End: 2024-10-27

## 2024-08-28 ASSESSMENT — LIFESTYLE VARIABLES
AUDIT TOTAL SCORE: 1
HOW OFTEN DO YOU HAVE SIX OR MORE DRINKS ON ONE OCCASION: NEVER
SKIP TO QUESTIONS 9-10: 1
HOW OFTEN DO YOU HAVE A DRINK CONTAINING ALCOHOL: MONTHLY OR LESS
HOW OFTEN DURING THE LAST YEAR HAVE YOU BEEN UNABLE TO REMEMBER WHAT HAPPENED THE NIGHT BEFORE BECAUSE YOU HAD BEEN DRINKING: NEVER
HOW OFTEN DURING THE LAST YEAR HAVE YOU HAD A FEELING OF GUILT OR REMORSE AFTER DRINKING: NEVER
HAS A RELATIVE, FRIEND, DOCTOR, OR ANOTHER HEALTH PROFESSIONAL EXPRESSED CONCERN ABOUT YOUR DRINKING OR SUGGESTED YOU CUT DOWN: NO
HOW MANY STANDARD DRINKS CONTAINING ALCOHOL DO YOU HAVE ON A TYPICAL DAY: 1 OR 2
HAVE YOU OR SOMEONE ELSE BEEN INJURED AS A RESULT OF YOUR DRINKING: NO
HOW OFTEN DURING THE LAST YEAR HAVE YOU FAILED TO DO WHAT WAS NORMALLY EXPECTED FROM YOU BECAUSE OF DRINKING: NEVER
HOW OFTEN DURING THE LAST YEAR HAVE YOU FOUND THAT YOU WERE NOT ABLE TO STOP DRINKING ONCE YOU HAD STARTED: NEVER
HOW OFTEN DURING THE LAST YEAR HAVE YOU NEEDED AN ALCOHOLIC DRINK FIRST THING IN THE MORNING TO GET YOURSELF GOING AFTER A NIGHT OF HEAVY DRINKING: NEVER
AUDIT-C TOTAL SCORE: 1

## 2024-08-28 ASSESSMENT — PATIENT HEALTH QUESTIONNAIRE - PHQ9
2. FEELING DOWN, DEPRESSED OR HOPELESS: MORE THAN HALF THE DAYS
SUM OF ALL RESPONSES TO PHQ QUESTIONS 1-9: 8
6. FEELING BAD ABOUT YOURSELF - OR THAT YOU ARE A FAILURE OR HAVE LET YOURSELF OR YOUR FAMILY DOWN: SEVERAL DAYS
5. POOR APPETITE OR OVEREATING: NOT AT ALL
4. FEELING TIRED OR HAVING LITTLE ENERGY: NEARLY EVERY DAY
3. TROUBLE FALLING OR STAYING ASLEEP OR SLEEPING TOO MUCH: NOT AT ALL
1. LITTLE INTEREST OR PLEASURE IN DOING THINGS: SEVERAL DAYS
8. MOVING OR SPEAKING SO SLOWLY THAT OTHER PEOPLE COULD HAVE NOTICED. OR THE OPPOSITE, BEING SO FIGETY OR RESTLESS THAT YOU HAVE BEEN MOVING AROUND A LOT MORE THAN USUAL: SEVERAL DAYS
9. THOUGHTS THAT YOU WOULD BE BETTER OFF DEAD, OR OF HURTING YOURSELF: NOT AT ALL
SUM OF ALL RESPONSES TO PHQ9 QUESTIONS 1 AND 2: 3
7. TROUBLE CONCENTRATING ON THINGS, SUCH AS READING THE NEWSPAPER OR WATCHING TELEVISION: NOT AT ALL
10. IF YOU CHECKED OFF ANY PROBLEMS, HOW DIFFICULT HAVE THESE PROBLEMS MADE IT FOR YOU TO DO YOUR WORK, TAKE CARE OF THINGS AT HOME, OR GET ALONG WITH OTHER PEOPLE: SOMEWHAT DIFFICULT

## 2024-08-28 ASSESSMENT — ENCOUNTER SYMPTOMS
ENDOCRINE NEGATIVE: 1
CONSTITUTIONAL NEGATIVE: 1
HEMATOLOGIC/LYMPHATIC NEGATIVE: 1
OCCASIONAL FEELINGS OF UNSTEADINESS: 0
NEUROLOGICAL NEGATIVE: 1
GASTROINTESTINAL NEGATIVE: 1
RESPIRATORY NEGATIVE: 1
DEPRESSION: 0
EYES NEGATIVE: 1
DEPRESSION: 1
CARDIOVASCULAR NEGATIVE: 1
LOSS OF SENSATION IN FEET: 0
MUSCULOSKELETAL NEGATIVE: 1
ALLERGIC/IMMUNOLOGIC NEGATIVE: 1

## 2024-08-28 ASSESSMENT — PAIN SCALES - GENERAL: PAINLEVEL: 0-NO PAIN

## 2024-08-28 NOTE — ASSESSMENT & PLAN NOTE
Orders:    Comprehensive Metabolic Panel; Future    Albumin-Creatinine Ratio, Urine Random; Future    Follow Up In Primary Care - Established; Future    Comprehensive Metabolic Panel    Albumin-Creatinine Ratio, Urine Random

## 2024-08-28 NOTE — ASSESSMENT & PLAN NOTE
Still having issues with sadness.  Will try increasing fluoxetine to 40 mg and follow up in 1 month    Orders:    FLUoxetine (PROzac) 40 mg capsule; Take 1 capsule (40 mg) by mouth once daily.    Comprehensive Metabolic Panel; Future    Follow Up In Primary Care - Established; Future    Comprehensive Metabolic Panel

## 2024-08-28 NOTE — ASSESSMENT & PLAN NOTE
Well controlled with fluoxetine therapy    Orders:    FLUoxetine (PROzac) 40 mg capsule; Take 1 capsule (40 mg) by mouth once daily.    Comprehensive Metabolic Panel; Future    Follow Up In Primary Care - Established; Future    Comprehensive Metabolic Panel

## 2024-08-28 NOTE — PROGRESS NOTES
"Subjective   Patient ID: Becca Irene is a 28 y.o. female who presents for Depression (She has been out of her medication for a couple days.).    Depression       Review of Systems   Constitutional: Negative.    HENT: Negative.     Eyes: Negative.    Respiratory: Negative.     Cardiovascular: Negative.    Gastrointestinal: Negative.    Endocrine: Negative.    Genitourinary: Negative.    Musculoskeletal: Negative.    Skin: Negative.    Allergic/Immunologic: Negative.    Neurological: Negative.    Hematological: Negative.    Psychiatric/Behavioral:  Positive for depression.        Objective   /70 (BP Location: Left arm, Patient Position: Sitting)   Pulse 79   Temp 36.7 °C (98.1 °F) (Temporal)   Ht 1.676 m (5' 6\")   Wt 89.8 kg (198 lb)   SpO2 98%   BMI 31.96 kg/m²     Physical Exam  Constitutional:       Appearance: Normal appearance.   HENT:      Head: Normocephalic and atraumatic.      Right Ear: Tympanic membrane normal.      Left Ear: Tympanic membrane normal.      Nose: Nose normal.      Mouth/Throat:      Mouth: Mucous membranes are moist.   Eyes:      Pupils: Pupils are equal, round, and reactive to light.   Cardiovascular:      Rate and Rhythm: Normal rate and regular rhythm.      Pulses: Normal pulses.      Heart sounds: Normal heart sounds.   Pulmonary:      Effort: Pulmonary effort is normal.      Breath sounds: Normal breath sounds.   Abdominal:      General: Abdomen is flat.   Musculoskeletal:         General: Normal range of motion.      Cervical back: Normal range of motion.   Skin:     General: Skin is warm.      Capillary Refill: Capillary refill takes less than 2 seconds.   Neurological:      General: No focal deficit present.      Mental Status: She is alert.   Psychiatric:         Mood and Affect: Mood normal.         Assessment/Plan   Assessment & Plan  Type 1 diabetes mellitus with hyperglycemia (Multi)    Orders:    Comprehensive Metabolic Panel; Future    Albumin-Creatinine Ratio, " Urine Random; Future    Follow Up In Primary Care - Established; Future    Comprehensive Metabolic Panel    Albumin-Creatinine Ratio, Urine Random    Anxiety  Well controlled with fluoxetine therapy    Orders:    FLUoxetine (PROzac) 40 mg capsule; Take 1 capsule (40 mg) by mouth once daily.    Comprehensive Metabolic Panel; Future    Follow Up In Primary Care - Established; Future    Comprehensive Metabolic Panel    Moderate episode of recurrent major depressive disorder (Multi)  Still having issues with sadness.  Will try increasing fluoxetine to 40 mg and follow up in 1 month    Orders:    FLUoxetine (PROzac) 40 mg capsule; Take 1 capsule (40 mg) by mouth once daily.    Comprehensive Metabolic Panel; Future    Follow Up In Primary Care - Established; Future    Comprehensive Metabolic Panel    Fatigue, unspecified type    Orders:    TSH with reflex to Free T4 if abnormal; Future    TSH with reflex to Free T4 if abnormal    Vitamin D deficiency    Orders:    CBC and Auto Differential; Future    Vitamin D 25-Hydroxy,Total (for eval of Vitamin D levels); Future    CBC and Auto Differential    Vitamin D 25-Hydroxy,Total (for eval of Vitamin D levels)    Hypercholesterolemia    Orders:    Lipid Panel      Focus on healthy eating including lean proteins and vegetables.  Avoid high carbohydrate high sugar foods and drinks.  Try to increase physical activity as tolerated.  Goal is for 160 minutes/week of physical activity.  Check blood pressure 2-3 times a week.  Call for blood pressures greater than 140/90.  Bring list of blood pressures to next visit.  Check blood sugars 1-2 times per day.  Call for fasting blood sugars that are remaining greater than 150 or random blood sugars that are remaining greater than 180.

## 2024-08-29 LAB
25(OH)D3 SERPL-MCNC: 13 NG/ML (ref 30–100)
CREAT UR-MCNC: 92.8 MG/DL (ref 20–320)
MICROALBUMIN UR-MCNC: <7 MG/L
MICROALBUMIN/CREAT UR: NORMAL MG/G{CREAT}

## 2024-08-29 RX ORDER — ERGOCALCIFEROL 1.25 MG/1
50000 CAPSULE ORAL
Qty: 12 CAPSULE | Refills: 0 | Status: SHIPPED | OUTPATIENT
Start: 2024-09-01 | End: 2024-11-24

## 2024-09-17 ENCOUNTER — APPOINTMENT (OUTPATIENT)
Dept: PRIMARY CARE | Facility: CLINIC | Age: 29
End: 2024-09-17
Payer: MEDICAID

## 2024-10-01 ENCOUNTER — TELEPHONE (OUTPATIENT)
Dept: PRIMARY CARE | Facility: CLINIC | Age: 29
End: 2024-10-01

## 2024-10-01 ENCOUNTER — APPOINTMENT (OUTPATIENT)
Dept: PRIMARY CARE | Facility: CLINIC | Age: 29
End: 2024-10-01
Payer: MEDICAID

## 2024-10-01 NOTE — TELEPHONE ENCOUNTER
CALL TO PT MADE DUE TO NO SHOW APPOINTMENT. LM ON MACHINE TO HAVE PT CALL OFFICE BACK TO RESCHEDULE.   THIS IS PTS SECOND NO SHOW APPOINTMENT. NO SHOW DATES OF 03/01/2024 AND 10/01/2024

## 2024-10-16 ENCOUNTER — TELEPHONE (OUTPATIENT)
Dept: ENDOCRINOLOGY | Facility: CLINIC | Age: 29
End: 2024-10-16
Payer: MEDICAID

## 2024-10-18 ENCOUNTER — APPOINTMENT (OUTPATIENT)
Dept: ENDOCRINOLOGY | Facility: CLINIC | Age: 29
End: 2024-10-18
Payer: MEDICAID

## 2024-10-22 DIAGNOSIS — F41.9 ANXIETY: ICD-10-CM

## 2024-10-22 DIAGNOSIS — F33.1 MODERATE EPISODE OF RECURRENT MAJOR DEPRESSIVE DISORDER: ICD-10-CM

## 2024-10-22 RX ORDER — FLUOXETINE HYDROCHLORIDE 40 MG/1
40 CAPSULE ORAL DAILY
Qty: 30 CAPSULE | Refills: 1 | Status: SHIPPED | OUTPATIENT
Start: 2024-10-22 | End: 2024-12-21

## 2024-10-30 ENCOUNTER — APPOINTMENT (OUTPATIENT)
Dept: PRIMARY CARE | Facility: CLINIC | Age: 29
End: 2024-10-30
Payer: MEDICAID

## 2024-10-30 VITALS
SYSTOLIC BLOOD PRESSURE: 116 MMHG | WEIGHT: 204.2 LBS | TEMPERATURE: 97.9 F | HEIGHT: 66 IN | BODY MASS INDEX: 32.82 KG/M2 | DIASTOLIC BLOOD PRESSURE: 66 MMHG | OXYGEN SATURATION: 97 % | HEART RATE: 77 BPM

## 2024-10-30 DIAGNOSIS — F33.1 MODERATE EPISODE OF RECURRENT MAJOR DEPRESSIVE DISORDER: ICD-10-CM

## 2024-10-30 DIAGNOSIS — F41.9 ANXIETY: ICD-10-CM

## 2024-10-30 PROCEDURE — 1036F TOBACCO NON-USER: CPT | Performed by: NURSE PRACTITIONER

## 2024-10-30 PROCEDURE — 3074F SYST BP LT 130 MM HG: CPT | Performed by: NURSE PRACTITIONER

## 2024-10-30 PROCEDURE — 3062F POS MACROALBUMINURIA REV: CPT | Performed by: NURSE PRACTITIONER

## 2024-10-30 PROCEDURE — 99213 OFFICE O/P EST LOW 20 MIN: CPT | Performed by: NURSE PRACTITIONER

## 2024-10-30 PROCEDURE — 3049F LDL-C 100-129 MG/DL: CPT | Performed by: NURSE PRACTITIONER

## 2024-10-30 PROCEDURE — 3078F DIAST BP <80 MM HG: CPT | Performed by: NURSE PRACTITIONER

## 2024-10-30 PROCEDURE — 3008F BODY MASS INDEX DOCD: CPT | Performed by: NURSE PRACTITIONER

## 2024-10-30 RX ORDER — FLUOXETINE HYDROCHLORIDE 40 MG/1
40 CAPSULE ORAL DAILY
Qty: 90 CAPSULE | Refills: 3 | Status: SHIPPED | OUTPATIENT
Start: 2024-10-30 | End: 2025-10-30

## 2024-10-30 ASSESSMENT — LIFESTYLE VARIABLES
HOW OFTEN DO YOU HAVE SIX OR MORE DRINKS ON ONE OCCASION: NEVER
HAVE YOU OR SOMEONE ELSE BEEN INJURED AS A RESULT OF YOUR DRINKING: NO
HOW OFTEN DURING THE LAST YEAR HAVE YOU FOUND THAT YOU WERE NOT ABLE TO STOP DRINKING ONCE YOU HAD STARTED: NEVER
HOW OFTEN DURING THE LAST YEAR HAVE YOU BEEN UNABLE TO REMEMBER WHAT HAPPENED THE NIGHT BEFORE BECAUSE YOU HAD BEEN DRINKING: NEVER
HOW OFTEN DURING THE LAST YEAR HAVE YOU HAD A FEELING OF GUILT OR REMORSE AFTER DRINKING: NEVER
HOW MANY STANDARD DRINKS CONTAINING ALCOHOL DO YOU HAVE ON A TYPICAL DAY: 1 OR 2
AUDIT TOTAL SCORE: 1
HOW OFTEN DURING THE LAST YEAR HAVE YOU NEEDED AN ALCOHOLIC DRINK FIRST THING IN THE MORNING TO GET YOURSELF GOING AFTER A NIGHT OF HEAVY DRINKING: NEVER
HOW OFTEN DURING THE LAST YEAR HAVE YOU FAILED TO DO WHAT WAS NORMALLY EXPECTED FROM YOU BECAUSE OF DRINKING: NEVER
AUDIT-C TOTAL SCORE: 1
SKIP TO QUESTIONS 9-10: 1
HAS A RELATIVE, FRIEND, DOCTOR, OR ANOTHER HEALTH PROFESSIONAL EXPRESSED CONCERN ABOUT YOUR DRINKING OR SUGGESTED YOU CUT DOWN: NO
HOW OFTEN DO YOU HAVE A DRINK CONTAINING ALCOHOL: MONTHLY OR LESS

## 2024-10-30 ASSESSMENT — ENCOUNTER SYMPTOMS
NIGHTTIME AWAKENINGS: OCCASIONAL
NEUROLOGICAL NEGATIVE: 1
ENDOCRINE NEGATIVE: 1
DEPRESSION: 0
LOSS OF SENSATION IN FEET: 0
RESPIRATORY NEGATIVE: 1
HOURS OF SLEEP PER NIGHT: 8 HOURS
GASTROINTESTINAL NEGATIVE: 1
DEPRESSION: 1
HEMATOLOGIC/LYMPHATIC NEGATIVE: 1
SLEEP QUALITY: GOOD
ALLERGIC/IMMUNOLOGIC NEGATIVE: 1
OCCASIONAL FEELINGS OF UNSTEADINESS: 0
EYES NEGATIVE: 1
MUSCULOSKELETAL NEGATIVE: 1
CARDIOVASCULAR NEGATIVE: 1
IRRITABILITY: 1

## 2024-10-30 ASSESSMENT — PAIN SCALES - GENERAL: PAINLEVEL_OUTOF10: 0-NO PAIN

## 2024-10-30 ASSESSMENT — PATIENT HEALTH QUESTIONNAIRE - PHQ9
2. FEELING DOWN, DEPRESSED OR HOPELESS: NOT AT ALL
SUM OF ALL RESPONSES TO PHQ9 QUESTIONS 1 AND 2: 0
1. LITTLE INTEREST OR PLEASURE IN DOING THINGS: NOT AT ALL

## 2024-11-21 ENCOUNTER — APPOINTMENT (OUTPATIENT)
Dept: PRIMARY CARE | Facility: CLINIC | Age: 29
End: 2024-11-21
Payer: MEDICAID

## 2025-01-24 NOTE — PROGRESS NOTES
"HPI   30 yo of [facility] practice with Type 1 Dm (dx age 9) here in follow up (cancelled and no showed last 2 visit).  Pt had 1st child oct 9, 2023 complicated by preeclampsia.  A1c-5.5% last visit (falsely low after recent childbirth/anemia), today      Pt using tslim with dexcom g6.  Basal rates:  12am-0.85, 1:11  3am-0.95  7am-0.95, 1:7  2pm-0.85     Isf-35  Target -130     30 day dexcom data: 54% in range, 1% low, pattern: upper 100's overnight into waking, mid/upper 100's during the day  -at times forgetting to bolus    -depression stable off meds currently  -pt is struggling with weight        Current Outpatient Medications:     Dexcom G4 platinum transmitter (Dexcom G6 Transmitter) device, Use one every 90 days, Disp: 1 each, Rfl: 3    Dexcom G6 Sensor device, USE AS DIRECTED REPLACE every 10 days, Disp: 9 each, Rfl: 3    insulin lispro (HumaLOG) 100 unit/mL injection, inject up to 100 units VIA INSULIN PUMP DAILY, Disp: 100 mL, Rfl: 3    glucagon (Glucagen) 1 mg injection, inject 1 mg as directed for severe hypoglycemia (Patient not taking: Reported on 1/27/2025), Disp: , Rfl:       Allergies as of 01/27/2025    (No Known Allergies)         Review of Systems   Cardiology: Lightheadedness-denies.  Chest pain-denies.  Leg edema-denies.  Palpitations-denies.  Respiratory: Cough-denies. Shortness of breath-denies.  Wheezing-denies.  Gastroenterology: Constipation-denies.  Diarrhea-denies.  Heartburn-denies.  Endocrinology: Cold intolerance-denies.  Heat intolerance-denies.  Sweats-denies.  Neurology: Headache-denies.  Tremor-denies.  Neuropathy in extremities-denies.  Psychology: Low energy-denies.  Irritability-denies.  Sleep disturbances-denies.      /75 (BP Location: Right arm, Patient Position: Sitting)   Pulse 80   Ht 1.689 m (5' 6.5\")   Wt 94.3 kg (207 lb 12.8 oz)   BMI 33.04 kg/m²       Labs:  Lab Results   Component Value Date    WBC 6.2 08/28/2024    NRBC 0.0 08/28/2024    RBC 4.42 08/28/2024 " "   HGB 12.9 08/28/2024    HCT 38.6 08/28/2024     08/28/2024     Lab Results   Component Value Date    CALCIUM 9.2 08/28/2024    AST 18 08/28/2024    ALKPHOS 77 08/28/2024    BILITOT 0.6 08/28/2024    PROT 7.6 08/28/2024    ALBUMIN 4.4 08/28/2024    GLOB 3.4 07/11/2022    AGR 1.2 (L) 07/11/2022     (L) 08/28/2024    K 4.8 08/28/2024     08/28/2024    CO2 28 08/28/2024    ANIONGAP 12 08/28/2024    BUN 15 08/28/2024    CREATININE 0.79 08/28/2024    UREACREAUR 14.3 07/11/2022    GLUCOSE 248 (H) 08/28/2024    ALT 19 08/28/2024    EGFR >90 08/28/2024     Lab Results   Component Value Date    CHOL 181 08/28/2024    TRIG 58 08/28/2024    HDL 63.4 08/28/2024    LDLCALC 106 (H) 08/28/2024     Lab Results   Component Value Date    MICROALBCREA  08/28/2024      Comment:      One or more analytes used in this calculation is outside of the analytical measurement range. Calculation cannot be performed.     Lab Results   Component Value Date    TSH 1.12 08/28/2024     No results found for: \"[id number]\"  Lab Results   Component Value Date    HGBA1C 6.9 (A) 01/27/2025         Physical Exam   General Appearance: pleasant, cooperative, no acute distress  HEENT: no chemosis, no proptosis, no lid lag, no lid retraction  Neck: no lymphadenopathy, no thyromegaly, no dominant thyroid nodules  Heart: no murmurs, regular rate and rhythm, S1 and S2  Lungs: no wheezes, no rhonci, no rales  Extremities: no lower extremity swelling      Assessment/Plan   1. Type 1 diabetes mellitus without complication (Primary)  -A1c order and reviewed  -30 days dexcom/pump data reviewed  -labs reviewed    -buy new relion prime meter  -change target to 110 on pump  -bolus when starting to eat a meal, this will make all the difference  -if still high after 1-2 weeks, consider changing I:C ratios from 1:7 to 1:6.5 or 1:6 during the day    -please have dilated eye exam     2. Dyslipidemia  -given duration of dm1 and levels start 10mg " atorvastatin  -reviewed risk/benefits/warnings, stop if contemplating pregnancy    3. Weight gain  -work on portion control/carb control in the diet    Follow Up:  -bryon Andrews 6 months    Medical Decision Making  Complexity of problem: Chronic illness of diabetes mellitus uncontrolled, progressing  Data analyzed and reviewed: Reviewed prior notes, blood glucose data, labs including HgbA1c, lipids, serum chemistries.  Ordered tests.   Risk of complications and morbidities: Is definite because of use of insulin and risk of hypoglycemia.  Prescription medications reviewed and modifications made.  Compliance assessed.  Addressed social determinants of health including food insecurity.

## 2025-01-27 ENCOUNTER — APPOINTMENT (OUTPATIENT)
Dept: ENDOCRINOLOGY | Facility: CLINIC | Age: 30
End: 2025-01-27
Payer: MEDICAID

## 2025-01-27 VITALS
DIASTOLIC BLOOD PRESSURE: 75 MMHG | HEIGHT: 67 IN | HEART RATE: 80 BPM | SYSTOLIC BLOOD PRESSURE: 118 MMHG | BODY MASS INDEX: 32.62 KG/M2 | WEIGHT: 207.8 LBS

## 2025-01-27 DIAGNOSIS — E10.9 TYPE 1 DIABETES MELLITUS WITHOUT COMPLICATION: Primary | ICD-10-CM

## 2025-01-27 LAB — POC HEMOGLOBIN A1C: 6.9 % (ref 4.2–6.5)

## 2025-01-27 PROCEDURE — 99214 OFFICE O/P EST MOD 30 MIN: CPT | Performed by: INTERNAL MEDICINE

## 2025-01-27 PROCEDURE — 83036 HEMOGLOBIN GLYCOSYLATED A1C: CPT | Performed by: INTERNAL MEDICINE

## 2025-01-27 PROCEDURE — 3008F BODY MASS INDEX DOCD: CPT | Performed by: INTERNAL MEDICINE

## 2025-01-27 PROCEDURE — 3074F SYST BP LT 130 MM HG: CPT | Performed by: INTERNAL MEDICINE

## 2025-01-27 PROCEDURE — 1036F TOBACCO NON-USER: CPT | Performed by: INTERNAL MEDICINE

## 2025-01-27 PROCEDURE — 3078F DIAST BP <80 MM HG: CPT | Performed by: INTERNAL MEDICINE

## 2025-01-27 RX ORDER — ATORVASTATIN CALCIUM 10 MG/1
10 TABLET, FILM COATED ORAL DAILY
Qty: 90 TABLET | Refills: 1 | Status: SHIPPED | OUTPATIENT
Start: 2025-01-27 | End: 2026-01-27

## 2025-01-27 ASSESSMENT — ENCOUNTER SYMPTOMS
DEPRESSION: 0
OCCASIONAL FEELINGS OF UNSTEADINESS: 0
LOSS OF SENSATION IN FEET: 0

## 2025-01-27 ASSESSMENT — PATIENT HEALTH QUESTIONNAIRE - PHQ9
SUM OF ALL RESPONSES TO PHQ9 QUESTIONS 1 & 2: 0
1. LITTLE INTEREST OR PLEASURE IN DOING THINGS: NOT AT ALL
2. FEELING DOWN, DEPRESSED OR HOPELESS: NOT AT ALL

## 2025-01-27 ASSESSMENT — PAIN SCALES - GENERAL: PAINLEVEL_OUTOF10: 0-NO PAIN

## 2025-01-31 DIAGNOSIS — E10.9 TYPE 1 DIABETES MELLITUS WITHOUT COMPLICATION: ICD-10-CM

## 2025-01-31 RX ORDER — BLOOD-GLUCOSE SENSOR
EACH MISCELLANEOUS
Qty: 3 EACH | Refills: 6 | Status: SHIPPED | OUTPATIENT
Start: 2025-01-31

## 2025-03-13 DIAGNOSIS — E10.9 TYPE 1 DIABETES MELLITUS WITHOUT COMPLICATION: ICD-10-CM

## 2025-03-14 RX ORDER — INSULIN LISPRO 100 [IU]/ML
INJECTION, SOLUTION INTRAVENOUS; SUBCUTANEOUS
Qty: 50 ML | Refills: 7 | Status: SHIPPED | OUTPATIENT
Start: 2025-03-14

## 2025-03-31 DIAGNOSIS — O24.013 TYPE 1 DIABETES MELLITUS AFFECTING PREGNANCY IN THIRD TRIMESTER, ANTEPARTUM (HHS-HCC): ICD-10-CM

## 2025-03-31 RX ORDER — BLOOD-GLUCOSE TRANSMITTER
EACH MISCELLANEOUS
Qty: 1 EACH | Refills: 3 | Status: SHIPPED | OUTPATIENT
Start: 2025-03-31

## 2025-07-25 NOTE — PROGRESS NOTES
HPI   28 yo of [facility] practice with Type 1 Dm (dx age 9) here in follow up (cancelled and no showed last 2 visit).  Pt had 1st child oct 9, 2023 complicated by preeclampsia.  A1c-5.5% last visit (falsely low after recent childbirth/anemia), today      Tandem t-slim with control IQ, Dexcom G    Time  Basal Rate (u/hour)  12:00 AM  0.85   3:00 AM  0.95   2:00 PM  0.85             Total Daily Basal:               units           Time  Correction Factor (mg/dl)  12:00 AM  35    Time  Carb Ratio (unit:grams)  12:00 AM   1:11   7:00 AM  1:7        30 day dexcom data: 54% in range, 1% low, pattern: upper 100's overnight into waking, mid/upper 100's during the day  -at times forgetting to bolus    -depression stable off meds currently  -pt is struggling with weight    1/25bb  1. Type 1 diabetes mellitus without complication (Primary)  -A1c order and reviewed  -30 days dexcom/pump data reviewed  -labs reviewed    -buy new relion prime meter  -change target to 110 on pump  -bolus when starting to eat a meal, this will make all the difference  -if still high after 1-2 weeks, consider changing I:C ratios from 1:7 to 1:6.5 or 1:6 during the day    -please have dilated eye exam     2. Dyslipidemia  -given duration of dm1 and levels start 10mg atorvastatin  -reviewed risk/benefits/warnings, stop if contemplating pregnancy    3. Weight gain  -work on portion control/carb control in the diet          Current Outpatient Medications:     atorvastatin (Lipitor) 10 mg tablet, Take 1 tablet (10 mg) by mouth once daily., Disp: 90 tablet, Rfl: 1    blood-glucose sensor (Dexcom G6 Sensor) device, USE AS DIRECTED REPLACE EVERY 10 DAYS, Disp: 3 each, Rfl: 6    blood-glucose transmitter device (Dexcom G6 Transmitter) device, USE AS DIRECTED EVERY 90 DAYS, Disp: 1 each, Rfl: 3    glucagon (Glucagen) 1 mg injection, inject 1 mg as directed for severe hypoglycemia (Patient not taking: Reported on 1/27/2025), Disp: , Rfl:     insulin lispro  "(HumaLOG U-100 Insulin) 100 unit/mL injection, INJECT UP  UNITS VIA INSULIN PUMP DAILY, Disp: 50 mL, Rfl: 7    Allergies as of 07/28/2025    (No Known Allergies)       There were no vitals taken for this visit.    Labs:   Lab Results   Component Value Date    WBC 6.2 08/28/2024    NRBC 0.0 08/28/2024    RBC 4.42 08/28/2024    HGB 12.9 08/28/2024    HCT 38.6 08/28/2024     08/28/2024     Lab Results   Component Value Date    CALCIUM 9.2 08/28/2024    AST 18 08/28/2024    ALKPHOS 77 08/28/2024    BILITOT 0.6 08/28/2024    PROT 7.6 08/28/2024    ALBUMIN 4.4 08/28/2024    GLOB 3.4 07/11/2022    AGR 1.2 (L) 07/11/2022     (L) 08/28/2024    K 4.8 08/28/2024     08/28/2024    CO2 28 08/28/2024    ANIONGAP 12 08/28/2024    BUN 15 08/28/2024    CREATININE 0.79 08/28/2024    UREACREAUR 14.3 07/11/2022    GLUCOSE 248 (H) 08/28/2024    ALT 19 08/28/2024    EGFR >90 08/28/2024     Lab Results   Component Value Date    CHOL 181 08/28/2024    TRIG 58 08/28/2024    HDL 63.4 08/28/2024    LDLCALC 106 (H) 08/28/2024     Lab Results   Component Value Date    MICROALBCREA  08/28/2024      Comment:      One or more analytes used in this calculation is outside of the analytical measurement range. Calculation cannot be performed.     Lab Results   Component Value Date    TSH 1.12 08/28/2024     No results found for: \"[id number]\"  Lab Results   Component Value Date    HGBA1C 6.9 (A) 01/27/2025         Assessment/Plan   1. Type 1 diabetes mellitus without complication (Primary)  -A1c ordered and reviewed  -labs reviewed  -tandem source data reviewed with patient (scanned in epic)      2. Presence of hybrid closed-loop insulin pump system  -tandem t-slim with control IQ            Follow up:     -labs/tests/notes reviewed  -reviewed and counseled patient on medication monitoring and side effects    Treatment and plan discussed with Dr. Dukes. Yoseph BRAGG Certified Diabetes Care and     Medical " Decision Making  Complexity of problem: Chronic illness of diabetes mellitus uncontrolled, progressing  Data analyzed and reviewed: Reviewed prior notes, blood glucose data, labs including HgbA1c, lipids, serum chemistries.  Ordered tests.   Risk of complications and morbidities: Is definite because of use of insulin and risk of hypoglycemia.  Prescription medications reviewed and modifications made.  Compliance assessed.  Addressed social determinants of health including food insecurity.

## 2025-07-28 ENCOUNTER — APPOINTMENT (OUTPATIENT)
Dept: ENDOCRINOLOGY | Facility: CLINIC | Age: 30
End: 2025-07-28
Payer: MEDICAID

## 2025-07-28 DIAGNOSIS — E10.9 TYPE 1 DIABETES MELLITUS WITHOUT COMPLICATION: Primary | ICD-10-CM

## 2025-07-28 DIAGNOSIS — Z96.41 PRESENCE OF HYBRID CLOSED-LOOP INSULIN PUMP SYSTEM: ICD-10-CM

## 2025-08-14 DIAGNOSIS — E10.9 TYPE 1 DIABETES MELLITUS WITHOUT COMPLICATION: ICD-10-CM

## 2025-08-14 RX ORDER — ATORVASTATIN CALCIUM 10 MG/1
10 TABLET, FILM COATED ORAL DAILY
Qty: 90 TABLET | Refills: 1 | Status: SHIPPED | OUTPATIENT
Start: 2025-08-14

## 2025-10-21 ENCOUNTER — APPOINTMENT (OUTPATIENT)
Facility: CLINIC | Age: 30
End: 2025-10-21
Payer: MEDICAID

## (undated) DEVICE — DEVICE, JADA SYSTEM

## (undated) DEVICE — Device